# Patient Record
Sex: FEMALE | Race: BLACK OR AFRICAN AMERICAN | NOT HISPANIC OR LATINO | Employment: FULL TIME | URBAN - METROPOLITAN AREA
[De-identification: names, ages, dates, MRNs, and addresses within clinical notes are randomized per-mention and may not be internally consistent; named-entity substitution may affect disease eponyms.]

---

## 2024-10-29 ENCOUNTER — OFFICE VISIT (OUTPATIENT)
Dept: PAIN MEDICINE | Facility: CLINIC | Age: 59
End: 2024-10-29
Payer: MEDICARE

## 2024-10-29 VITALS
DIASTOLIC BLOOD PRESSURE: 74 MMHG | HEIGHT: 60 IN | HEART RATE: 77 BPM | WEIGHT: 133.19 LBS | SYSTOLIC BLOOD PRESSURE: 120 MMHG | BODY MASS INDEX: 26.15 KG/M2

## 2024-10-29 DIAGNOSIS — G89.4 CHRONIC PAIN SYNDROME: Primary | ICD-10-CM

## 2024-10-29 DIAGNOSIS — Z98.1 STATUS POST CERVICAL SPINAL FUSION: ICD-10-CM

## 2024-10-29 DIAGNOSIS — M96.1 POST LAMINECTOMY SYNDROME: ICD-10-CM

## 2024-10-29 DIAGNOSIS — Z98.1 STATUS POST LUMBAR SPINAL FUSION: ICD-10-CM

## 2024-10-29 DIAGNOSIS — F11.90 CHRONIC, CONTINUOUS USE OF OPIOIDS: ICD-10-CM

## 2024-10-29 PROCEDURE — 99999 PR PBB SHADOW E&M-EST. PATIENT-LVL III: CPT | Mod: PBBFAC,,, | Performed by: NURSE PRACTITIONER

## 2024-10-29 PROCEDURE — 99213 OFFICE O/P EST LOW 20 MIN: CPT | Mod: PBBFAC,PO | Performed by: NURSE PRACTITIONER

## 2024-10-29 PROCEDURE — 99214 OFFICE O/P EST MOD 30 MIN: CPT | Mod: S$PBB,,, | Performed by: NURSE PRACTITIONER

## 2024-10-29 RX ORDER — OXYCODONE AND ACETAMINOPHEN 10; 325 MG/1; MG/1
1 TABLET ORAL 3 TIMES DAILY PRN
Qty: 90 TABLET | Refills: 0 | Status: SHIPPED | OUTPATIENT
Start: 2024-12-01 | End: 2024-12-31

## 2024-10-29 RX ORDER — OXYCODONE AND ACETAMINOPHEN 10; 325 MG/1; MG/1
1 TABLET ORAL 3 TIMES DAILY PRN
Qty: 90 TABLET | Refills: 0 | Status: SHIPPED | OUTPATIENT
Start: 2024-12-31 | End: 2025-01-30

## 2024-10-29 RX ORDER — OXYCODONE AND ACETAMINOPHEN 10; 325 MG/1; MG/1
1 TABLET ORAL 3 TIMES DAILY PRN
Qty: 90 TABLET | Refills: 0 | Status: SHIPPED | OUTPATIENT
Start: 2024-11-01 | End: 2024-12-01

## 2024-11-01 RX ORDER — OMEPRAZOLE 40 MG/1
40 CAPSULE, DELAYED RELEASE ORAL EVERY MORNING
Qty: 90 CAPSULE | Refills: 3 | Status: SHIPPED | OUTPATIENT
Start: 2024-11-01

## 2024-11-11 ENCOUNTER — CLINICAL SUPPORT (OUTPATIENT)
Dept: ENDOSCOPY | Facility: HOSPITAL | Age: 59
End: 2024-11-11
Attending: INTERNAL MEDICINE
Payer: MEDICARE

## 2024-11-11 DIAGNOSIS — Z12.11 COLON CANCER SCREENING: ICD-10-CM

## 2024-11-11 RX ORDER — SODIUM, POTASSIUM,MAG SULFATES 17.5-3.13G
1 SOLUTION, RECONSTITUTED, ORAL ORAL DAILY
Qty: 1 KIT | Refills: 0 | Status: SHIPPED | OUTPATIENT
Start: 2024-11-11 | End: 2024-11-13

## 2024-11-23 ENCOUNTER — PATIENT MESSAGE (OUTPATIENT)
Dept: ENDOSCOPY | Facility: HOSPITAL | Age: 59
End: 2024-11-23
Payer: MEDICARE

## 2024-11-23 ENCOUNTER — TELEPHONE (OUTPATIENT)
Dept: ENDOSCOPY | Facility: HOSPITAL | Age: 59
End: 2024-11-23
Payer: MEDICARE

## 2024-11-24 NOTE — TELEPHONE ENCOUNTER
Referral for procedure from PAT appointment      Spoke to patient to schedule procedure(s) Colonoscopy       Physician to perform procedure(s) Dr. MILAGROS Ba  Date of Procedure (s) 1/24/25  Arrival Time 7:00 AM  Time of Procedure(s) 8:00 AM   Location of Procedure(s) [facility]  Type of Rx Prep sent to patient: Suprep  Instructions provided to patient via Postal Mail    Patient was informed on the following information and verbalized understanding. Screening questionnaire reviewed with patient and complete. If procedure requires anesthesia, a responsible adult needs to be present to accompany the patient home, patient cannot drive after receiving anesthesia. Appointment details are tentative, especially check-in time. Patient will receive a prep-op call 7 days prior to confirm check-in time for procedure. If applicable the patient should contact their pharmacy to verify Rx for procedure prep is ready for pick-up. Patient was advised to call the scheduling department at [phone] if pharmacy states no Rx is available. Patient was advised to call the endoscopy scheduling department if any questions or concerns arise.      [facility] Scheduling Department

## 2024-12-02 RX ORDER — METHOCARBAMOL 750 MG/1
TABLET, FILM COATED ORAL
Qty: 90 TABLET | Refills: 2 | Status: SHIPPED | OUTPATIENT
Start: 2024-12-02

## 2024-12-31 RX ORDER — OXYCODONE AND ACETAMINOPHEN 10; 325 MG/1; MG/1
1 TABLET ORAL 3 TIMES DAILY PRN
Qty: 90 TABLET | Refills: 0 | Status: SHIPPED | OUTPATIENT
Start: 2024-12-31 | End: 2025-01-30

## 2024-12-31 NOTE — TELEPHONE ENCOUNTER
----- Message from Giacomo sent at 12/31/2024  1:32 PM CST -----  Type:  RX Refill Request    Who Called: pt  Refill or New Rx:refill  RX Name and Strength:oxyCODONE-acetaminophen (PERCOCET)  mg per tablet  Would the patient rather a call back or a response via [facility]? call  Best Call Back Number: [phone]  Additional Information: pt states the pharmacy stated they are out of Rx at this time and pt states she would like to see if provider can send Rx to [facility]. Pt states she would like a call back to see when Rx has been sent.Thank you

## 2025-01-24 ENCOUNTER — TELEPHONE (OUTPATIENT)
Dept: ENDOSCOPY | Facility: HOSPITAL | Age: 60
End: 2025-01-24
Payer: COMMERCIAL

## 2025-01-24 DIAGNOSIS — Z12.11 SPECIAL SCREENING FOR MALIGNANT NEOPLASMS, COLON: Primary | ICD-10-CM

## 2025-01-24 RX ORDER — SODIUM, POTASSIUM,MAG SULFATES 17.5-3.13G
1 SOLUTION, RECONSTITUTED, ORAL ORAL DAILY
Qty: 1 KIT | Refills: 0 | Status: SHIPPED | OUTPATIENT
Start: 2025-01-24 | End: 2025-01-27

## 2025-01-25 ENCOUNTER — OFFICE VISIT (OUTPATIENT)
Dept: PAIN MEDICINE | Facility: CLINIC | Age: 60
End: 2025-01-25
Payer: COMMERCIAL

## 2025-01-25 VITALS
HEIGHT: 60 IN | SYSTOLIC BLOOD PRESSURE: 112 MMHG | BODY MASS INDEX: 27.71 KG/M2 | HEART RATE: 74 BPM | WEIGHT: 141.13 LBS | DIASTOLIC BLOOD PRESSURE: 65 MMHG

## 2025-01-25 DIAGNOSIS — Z98.1 STATUS POST LUMBAR SPINAL FUSION: ICD-10-CM

## 2025-01-25 DIAGNOSIS — G89.29 CHRONIC BILATERAL LOW BACK PAIN WITHOUT SCIATICA: ICD-10-CM

## 2025-01-25 DIAGNOSIS — M54.50 CHRONIC BILATERAL LOW BACK PAIN WITHOUT SCIATICA: ICD-10-CM

## 2025-01-25 DIAGNOSIS — M96.1 POST LAMINECTOMY SYNDROME: ICD-10-CM

## 2025-01-25 DIAGNOSIS — F11.90 CHRONIC, CONTINUOUS USE OF OPIOIDS: ICD-10-CM

## 2025-01-25 DIAGNOSIS — G89.4 CHRONIC PAIN SYNDROME: Primary | ICD-10-CM

## 2025-01-25 DIAGNOSIS — M47.816 LUMBAR SPONDYLOSIS: ICD-10-CM

## 2025-01-25 DIAGNOSIS — Z98.1 STATUS POST CERVICAL SPINAL FUSION: ICD-10-CM

## 2025-01-25 PROCEDURE — 1159F MED LIST DOCD IN RCRD: CPT | Mod: CPTII,S$GLB,, | Performed by: STUDENT IN AN ORGANIZED HEALTH CARE EDUCATION/TRAINING PROGRAM

## 2025-01-25 PROCEDURE — 99999 PR PBB SHADOW E&M-EST. PATIENT-LVL IV: CPT | Mod: PBBFAC,,, | Performed by: STUDENT IN AN ORGANIZED HEALTH CARE EDUCATION/TRAINING PROGRAM

## 2025-01-25 PROCEDURE — 3078F DIAST BP <80 MM HG: CPT | Mod: CPTII,S$GLB,, | Performed by: STUDENT IN AN ORGANIZED HEALTH CARE EDUCATION/TRAINING PROGRAM

## 2025-01-25 PROCEDURE — G2211 COMPLEX E/M VISIT ADD ON: HCPCS | Mod: S$GLB,,, | Performed by: STUDENT IN AN ORGANIZED HEALTH CARE EDUCATION/TRAINING PROGRAM

## 2025-01-25 PROCEDURE — 99213 OFFICE O/P EST LOW 20 MIN: CPT | Mod: S$GLB,,, | Performed by: STUDENT IN AN ORGANIZED HEALTH CARE EDUCATION/TRAINING PROGRAM

## 2025-01-25 PROCEDURE — 3074F SYST BP LT 130 MM HG: CPT | Mod: CPTII,S$GLB,, | Performed by: STUDENT IN AN ORGANIZED HEALTH CARE EDUCATION/TRAINING PROGRAM

## 2025-01-25 PROCEDURE — 3008F BODY MASS INDEX DOCD: CPT | Mod: CPTII,S$GLB,, | Performed by: STUDENT IN AN ORGANIZED HEALTH CARE EDUCATION/TRAINING PROGRAM

## 2025-01-25 RX ORDER — NALOXONE HYDROCHLORIDE 4 MG/.1ML
SPRAY NASAL
Qty: 1 EACH | Refills: 11 | Status: SHIPPED | OUTPATIENT
Start: 2025-01-25

## 2025-01-25 RX ORDER — OXYCODONE AND ACETAMINOPHEN 10; 325 MG/1; MG/1
1 TABLET ORAL 3 TIMES DAILY PRN
Qty: 90 TABLET | Refills: 0 | Status: SHIPPED | OUTPATIENT
Start: 2025-03-01 | End: 2025-03-31

## 2025-01-25 RX ORDER — OXYCODONE AND ACETAMINOPHEN 10; 325 MG/1; MG/1
1 TABLET ORAL 3 TIMES DAILY PRN
Qty: 90 TABLET | Refills: 0 | Status: SHIPPED | OUTPATIENT
Start: 2025-03-31 | End: 2025-04-30

## 2025-01-25 RX ORDER — OXYCODONE AND ACETAMINOPHEN 10; 325 MG/1; MG/1
1 TABLET ORAL 3 TIMES DAILY PRN
Qty: 90 TABLET | Refills: 0 | Status: SHIPPED | OUTPATIENT
Start: 2025-01-31 | End: 2025-03-02

## 2025-01-25 NOTE — PROGRESS NOTES
[facility] Established Visit    Chief Complaint   Patient presents with    Follow-up    Medication Refill         1/25/2025     9:28 AM 10/29/2024     9:14 AM 8/2/2024     9:57 AM   Last 3 PDI Scores   Pain Disability Index (PDI) 60 70 60       Interval Update 1/25/2025: Patient return to clinic for three month follow up on medication refill.  She is requesting a refill of her Percocet 10/325 t.i.d..  Patient states she continues to take these medications daily with benefit to her chronic pain symptoms.  She reports the medication provides her 70% relief of her symptoms and improves her functionality. Denies any new symptoms, weakness, bowel or bladder function changes, recent falls or trauma.      Interval update 10/29/24:Patient return to clinic for medication refill.  Patient has a history of chronic pain syndrome specifically low back pain with intermittent leg pain she also suffers from midback pain at times.  She is on chronic opioid therapy that consists of Percocet  t.i.d. 90 tablets per month which continue to provide her 60 70% relief of her symptoms and improvement in her functionality.  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  She denies any new pain denies any profound weakness denies any bowel or bladder dysfunction denies any saddle anesthesia denies any recent incident or trauma.    Interval Update 08/02/2024: Patient return to clinic for medication refill.  Patient continues to take Celebrex, Robaxin, and Percocet 10/325 t.i.d. p.r.n. to manage her pain.  She reports her pain is well-controlled with this regimen, and the medication improves her functionality..  She denies any side effects of the medications.  She states she was compliant with the an exercise regimen.  She denies any changes to her pain at this time.  She requests a refill of Robaxin, Celebrex, and Percocet.  She rates her pain 10/10  "today.    Interval Update 06/05/2024: Patient returns to clinic for low back pain.  She presents with a "red spot in her mid back"   There is also blanching in the area of concern.  She does report some pain in the area upon palpation discussed with patient that this is not typically something that I treat an interventional pain recommended a dermatology referral for further evaluation.  She denies any signs and symptoms of infection  her vitals all within normal limits.    Interval History 5/3/2024:  60 y/o female presents for a follow iup apppt for medication refilil.,she has a hx of chronic pain synderome specifically her chronic low back pain she is currently on a stable low dose opioid regimen consists of Percocet  t.i.d. 90 tablets per month which continue to provide her 80-90% relief and improved functionality.  She did report visits to her local gym, she has an established a home exercise plan that has helped her with lumbar stabilization and muscular strengthening.  She continues to take the chronic opioid therapy without any adverse side effects.    Interval History 4/4/2024:  59-year-old female presents today for a medication refill.  This is her 30 day follow-up she is currently on chronic opioid therapy that consists of Percocet  t.i.d. 90 tablets a month which she continues to report 80% relief when taking this medication with improved functionality.  She did acknowledge intermittently taking half a extra tablet to help with midback pain.  Discussed with patient that compliant with medications very important particularly with regard to chronic opioid therapy patient verbalized understanding.  She reported breakthrough pain in between her doses of Percocet I have recommended the use of extra Tylenol for breakthrough pain not to exceed 3000 mg in 24 hours.  As Tylenol is attached to her current opioid.  She denies any new pain denies profound weakness denies any bowel bladder dysfunction at " this time.        Interval update 03/08/24 58-year-old female that presents today for a follow-up appointment for medication refill.  She is currently on chronic opioid therapy that consists of Percocet  t.i.d. 90 tablets per month she is reporting % relief when taking this medication and improved functionality she reports being able to perform her ADLs.  Denies any adverse side effects with this medication he would reports today increased neck pain over the last few weeks in the past I have prescribed her Celebrex which she states has been effective.  She denies any profound weakness denies any bowel bladder dysfunction denies saddle anesthesia denies any new pain today.      Interval update 02/08/24 58-year-old female that presents today for a follow-up appointment for medication refill.  She is currently on a stable low dose opioid regimen  that consists of Percocet   t.I.d. 90 tablets per month that she reports continued provide her 80% relief with improved functionality.  Her primary source of pain is chronic low back with intermittent bilateral leg pain has a history of a lumbar few at L5-S1 that is intact this was performed in 2015.  She denies any adverse side effects with the chronic opioid regimen discussed that she would continue her last dose of pain medication was this morning.      Interval history 01/08/2024  58-year-old female that presents today for a follow-up appointment for medication refill.  She has a history of chronic low back and bilateral leg pain.  She has history of a previous posterior body fusion L5-S1 2015.  Additionally she has a C4-5 anterior cervical diskectomy and fusion in 2017.  She is currently on chronic opioid therapy that consists of Percocet  t.i.d. 90 tablets per month where she continues to report provides her relief at 80% with improved functionality.  She denies any adverse side effects with this medication.  Additionally she complained of  continued midback pain that is worse following physical therapy.  She reports not wanting to return to physical therapy as she feels as though this exacerbates her symptoms.  Her and I discussed that PT is part of continued improvement in her functionality.      Interval history 12/07/2023:  58-year-old female with a history of chronic low back and bilateral leg pain.  She does have a history of a previous posterior and body fusion L5-S1 hardware is intact 2015.  Additionally she has a C4-C5 anterior cervical diskectomy and fusion in 2017.  She is currently being provided chronic opioid therapy that consists of Percocet  t.i.d. 90 tablets per month that she can report provides her 80-90% relief of her pain and improvement in functionality.  Her pain score today she reports as 10/10.      Interval History (11/02/2023) - Ms. Tucker is following up for medication refill to manage chronic lbp pain.  A refill of Oxycodone-Acetaminophen  mg t.i.d. 90 tablets per month is being requested today.  She reports 100% relief with the current medication regimen.  She reports the following medication side effects: none.  Pain is currently rate 7/10 with a weekly range 7-8/10. Pt is also S/P TPI on 10/2 with 20% relief for 2 to 3 days.  She does report improved functionality when taking her chronic opioid medication.  She did report that her PCP ordered a thoracic MRI to rule out pathology she is scheduled for that in the upcoming weeks.      Interval history (10/02/2023):  returns today for follow up for medication refill currently she is on a stable low dose opioid regimen that consists of Norco  t.i.d. 90 tablets per month.  She continues to report 90% relief when taking this medication for chronic low back pain.  Today she is complaining of mid back pain onset 5-6 weeks she denies any radicular symptoms to the front of her chest.  She denies any recent incident or trauma denies any bowel bladder dysfunction at  this time. Currently, the back pain is stable.        Current Pain Scales:  Current: 10/10              Typical Range: 10-10/10       Interval History(09/08/2023):  Chasity Tucker returns today for follow up medication refill. She is currently on a stable low dose opoid regimen that consist of Norco  TID # 90. She typically has been taking Percocet  TID however the supply was limited which is why she was taking/scribe Norco  t.i.d..  She reports that the Norco is not as helpful her previous Percocet prescription.  We discussed today we will consider switching her back to Percocet  t.i.d..  She reports 50-60% relief of her pain when taking her chronic opoid therapy, she denies any adverse SEs.  Currently, the back,leg pain is stable.      Current Pain Scales:  Current: 10/10              Typical Range: 10-10/10     Interval History (08/21/2023):  Chasity Tucker returns today for follow up.  Currently, the leg & bank pain is stable she is currently on a stable low dose opoid regimen that consist of Norco  t.i.d. 90 tablets she continues to report that this medication provides her with 50-60% relief of her symptoms and improvement in her functionality.  She was previously taking Percocet  t.i.d. however due to the shortage of Percocet at the pharmacy we switched her to [place]  t.i.d..  She denies any adverse side effects with any of her medications denies any new pain denies any profound weakness denies any saddle anesthesia at this time.  Of note she did report that she would like to switch back to Percocet  t.i.d. as this provided her better relief than the Norco .    Current Pain Scales:  Current: 10/10              Typical Range: 10-10/10     Interval History (07/24/2023):  Chasity Tucker returns today for follow up for chronic neck and low back pain.  She has not been seen since February of this year she is returning to  this clinic due to an insurance change her current pain provider Dr. Maurice is unable to take her insurance so she would like to switch back to our office.  She does have a history of chronic pain and is currently taking chronic opioid therapy that consists of Percocet .  She does report that she has had a few injections based on my recollection she does have a history of a spine fusion C4-C5 anterior cervical discectomy and fusion 2017 and  Previous posterior and body fusion L5-S1.  Hardware is intact - 2015.        Current Pain Scales:  Current: 10/10              Typical Range: 10-10/10     Interval Updates: (02/16/2023):  Chasity Tucker returns today for follow up for medication refill. She is currently on a stable low does opoid regimen that consist Percocet 7.5-325 TID # 90  Currently, the the low back and bilateral leg pain is worse. She would like to consider injections with our office, she was previously provided a caudal MIKALA per external provider/Dr. Salinas she she reports this has helped her pain in the past.  We also discussed frequent ED visits in the month of January she requested an increase in her chronic opioid therapy I explained to her that continuous increases in her chronic opioid therapy will not ultimately benefit her pain in that we should optimize other medications as well as attempt injections to help her.      Current Pain Scales:  Current: 9/10              Typical Range: 8-9/10     01/18/2023-Chasity Tucker is a 59 y.o. female who  has a past medical history of Allergy, Anemia, Anxiety, Asthma, Chronic back pain, Chronic neck pain, Depression, Disc, Full dentures, GERD (gastroesophageal reflux disease), Hypokalemia, Lumbar spondylosis (9/7/2022), and Recurrent upper respiratory infection (URI).  Patient presents requesting a medication refill she is currently on chronic opioid therapy that consists of Percocet 7.5-325 t.i.d. she reports 50% relief when  taking this medication and improvement in her functionality.  She continues to report low back pain radiating into her hips bilaterally and radiating into her legs stopping just above her knees.  She denies any new pain denies any profound weakness, denies any recently incident or trauma, denies any bowel bladder dysfunction.      12/15/2022 - Ms. Tucker is following up for medication refill to manage chronic low back pain.  A refill of Oxycodone-Acetaminophen 7.5-325 mg TID PRN is being requested today.  She reports 100% relief with the current medication regimen with improved functionality. She reports   She reports the following medication side effects: none.  Pain is currently rate 10/10 with a weekly range 10-10/10.      11/17/2022 - Ms. Kelsey Tucker is following up for medication refill to manage chronic back pain.  A refill of Oxycodone-Acetaminophen 7.5-325  TID # 90 mg is being requested today.  She reports 100% relief with the current medication regimen.  She reports the following medication side effects: none.  Pain is currently rate 10/10 with a weekly range 8-9/10.       10/6/2022 - Ms. Kelsey Tucker is following up for neck, right shoulder and low back pain. Pain is currently rate 10/10 with a weekly range 10-10/10.  Pain is described as sharp and stabbing with intermittent lumbar spasms. Pain is worse with any activity, she reports failing PT, she is reporting relief when taking perc 7.5 TID  she has been on this medication chronically per Dr. Salinas/external provider.     9/7/2022 - Ms. Tucker is following up for low back pain.  Pain is currently rate 10/10 with a weekly range 10-10/10.  It is described as Aching, Burning, and Sharp.       Chasity Tucker presents today complaining of low back bilateral leg pain, pain is been ongoing pain is constant, pain is aggravated with sitting, standing, bending walking, pain is mitigated with pain medicine.  This is my 1st clinic visit with this  patient she is a former patient of /[facility]le [facility] she was released from his practice due to her insurance not covering his network. She then saw Dr. Swan/[facility] an attempt to establish PM care. See his initial note below. She was provide a Rx of Percocet 7.5-325 # 45 pills to cover her pain and to prohibit withdrawal symptom. She  endorses not ever experiencing withdrawal symptoms despite being without medication for approximately 2 weeks. She reports only taking pain medication PRN, her previous dose that she reports provides her the most relief is Percocet  TID which she received for years per Dr. Salinas      Background from Chart Review  Per Dr. Swan's note-Original Pain Description:7/29/22  The pain is located in the lower back and radiates to both legs. The pain is described as aching and spasm. Exacerbating factors: Sitting, Standing, Bending and Walking. Mitigating factors medicine. Symptoms interfere with daily activity and sleeping. The patient feels like symptoms have been unchanged. Patient denies night fever/night sweats, urinary incontinence, bowel incontinence, significant weight loss, significant motor weakness and loss of sensations.  Patient was seen by outside Pain Management, but presents to this clinic because her insurance changed.     Treatment History  PT/OT: 6 weeks of Conservative therapy (PT/Chiro/Home Exercises with Dates)  Mar 2022 to June 2022  HEP: not currently   TENS:  Injections: Yes - Epidurals from Dr. Salinas's office that never helped  Surgery:  -C4-C5 anterior cervical discectomy and fusion 2017  -Previous posterior and body fusion L5-S1.  Hardware is intact - 2015  Medications:   - NSAIDS:   - MSK Relaxants:   - TCAs:   - SNRIs:   - Topicals:   - Opioids: Percocet  TID   - Anticonvulsants:     Current Pain Medications  Percocet  TID # 90    Full Medication List    Current Outpatient Medications:     albuterol (PROVENTIL/VENTOLIN HFA) 90 mcg/actuation  inhaler, Inhale 2 puffs into the lungs every 6 (six) hours as needed for Wheezing., Disp: 18 g, Rfl: 0    brompheniramine-pseudoeph-DM (BROMFED DM) 2-30-10 mg/5 mL Syrp, , Disp: , Rfl:     budesonide-formoterol 160-4.5 mcg (SYMBICORT) 160-4.5 mcg/actuation HFAA, , Disp: , Rfl:     butalbital-acetaminophen-caffeine -40 mg (FIORICET, ESGIC) -40 mg per tablet, TAKE 1 TABLET BY MOUTH EVERY DAY AS NEEDED FOR HEADACHE, Disp: 30 tablet, Rfl: 2    celecoxib (CELEBREX) 200 MG capsule, Take 1 capsule (200 mg total) by mouth once daily., Disp: 30 capsule, Rfl: 1    ferrous sulfate 325 (65 FE) MG EC tablet, Take 1 tablet (325 mg total) by mouth 2 (two) times daily with meals., Disp: , Rfl: 0    fluticasone propionate (FLONASE) 50 mcg/actuation nasal spray, 2 sprays (100 mcg total) by Each Nostril route once daily., Disp: 16 mL, Rfl: 0    fluticasone propionate (FLOVENT HFA) 110 mcg/actuation inhaler, Inhale 1 puff into the lungs every 12 (twelve) hours., Disp: 12 g, Rfl: 3    ipratropium (ATROVENT) 21 mcg (0.03 %) nasal spray, SPRAY 2 SPRAYS BY NASAL ROUTE 3 TIMES A DAY AS NEEDED FOR RHINITIS, Disp: 30 mL, Rfl: 6    LIDOcaine (LIDODERM) 5 %, Place 1 patch onto the skin once daily. Remove & Discard patch within 12 hours or as directed by MD, Disp: 30 patch, Rfl: 2    loratadine (CLARITIN) 10 mg tablet, Take 1 tablet (10 mg total) by mouth once daily., Disp: 60 tablet, Rfl: 5    methocarbamoL (ROBAXIN) 750 MG Tab, TAKE 1 TABLET BY MOUTH EVERY 8 HOURS, Disp: 90 tablet, Rfl: 2    NEBULIZER ACCESSORIES MISC,  EA, Refills(s) 0, eRx: [facility]/pharmacy #[id number], 1, Print SHEFALI Number, 1 device Please use as needed for wheezing Albuterol vials will be prescribed separately, 152 cm, cm, 04/18/20 23:36:00 CDT, Measured height in cm, 74.2, kg, 04/01/20 13:17:00 CDT, Meliton..., Disp: , Rfl:     omeprazole (PRILOSEC) 40 MG capsule, TAKE 1 CAPSULE (40 MG TOTAL) BY MOUTH EVERY MORNING., Disp: 90 capsule, Rfl: 3    oxyCODONE-acetaminophen  (PERCOCET)  mg per tablet, Take 1 tablet by mouth 3 (three) times daily as needed for Pain., Disp: 90 tablet, Rfl: 0    oxyCODONE-acetaminophen (PERCOCET)  mg per tablet, Take 1 tablet by mouth 3 (three) times daily as needed for Pain., Disp: 90 tablet, Rfl: 0    sodium chloride (OCEAN) 0.65 % nasal spray, 1 spray by Nasal route as needed for Congestion., Disp: 30 mL, Rfl: 0    sodium,potassium,mag sulfates (SUPREP BOWEL PREP KIT) 17.5-3.13-1.6 gram SolR, Take 177 mLs by mouth once daily. for 2 days, Disp: 1 kit, Rfl: 0    azelastine (ASTELIN) 137 mcg (0.1 %) nasal spray, 2 sprays (274 mcg total) by Nasal route 2 (two) times daily as needed for Rhinitis. Donot snort (because it tastes bad), Disp: 30 mL, Rfl: 1    naloxone (NARCAN) 4 mg/actuation Spry, 4mg by nasal route as needed for opioid overdose; may repeat every 2-3 minutes in alternating nostrils until medical help arrives. Call 911, Disp: 1 each, Rfl: 11    [START ON 1/31/2025] oxyCODONE-acetaminophen (PERCOCET)  mg per tablet, Take 1 tablet by mouth 3 (three) times daily as needed for Pain., Disp: 90 tablet, Rfl: 0    [START ON 3/1/2025] oxyCODONE-acetaminophen (PERCOCET)  mg per tablet, Take 1 tablet by mouth 3 (three) times daily as needed for Pain., Disp: 90 tablet, Rfl: 0    [START ON 3/31/2025] oxyCODONE-acetaminophen (PERCOCET)  mg per tablet, Take 1 tablet by mouth 3 (three) times daily as needed for Pain., Disp: 90 tablet, Rfl: 0     Review of Systems  Review of Systems   Musculoskeletal:  Positive for back pain, joint pain, myalgias and neck pain.       Medical History  Past Medical History:   Diagnosis Date    Allergy     Anemia     Anxiety     Asthma     Chronic back pain     Chronic neck pain     Depression     Disc     Full dentures     GERD (gastroesophageal reflux disease)     Hypokalemia     Lumbar spondylosis 9/7/2022    Recurrent upper respiratory infection (URI)         Surgical History  Past Surgical History:    Procedure Laterality Date    BACK SURGERY      CERVICAL SPINE SURGERY       SECTION      removal foreign body L Leg      REMOVAL OF HARDWARE FROM SPINE N/A 2019    Procedure: REMOVAL, HARDWARE, SPINE;  Surgeon: Edmar Milner MD;  [facility];  Service: Orthopedics;  Laterality: N/A;  medtronic     right shoulder surgery      SPINAL FUSION      TOTAL ABDOMINAL HYSTERECTOMY W/ BILATERAL SALPINGOOPHORECTOMY          Physical Exam      General Musculoskeletal Exam   Gait: normal     Back (L-Spine & T-Spine) / Neck (C-Spine) Exam     Tenderness Posterior midline palpation reveals tenderness of the Upper L-Spine, Lower T-Spine and Upper T-Spine. Right paramedian tenderness of the Lower T-Spine, Upper L-Spine, Upper T-Spine and Upper C-Spine. Left paramedian tenderness of the Lower T-Spine, Upper L-Spine, Lower C-Spine, Upper T-Spine and Upper C-Spine.     Back (L-Spine & T-Spine) Range of Motion   Lateral bend right:  abnormal   Lateral bend left:  abnormal   Rotation right:  abnormal   Rotation left:  abnormal     Comments:  TTP mid back       Muscle Strength   Right Lower Extremity   Quadriceps:  5/5   Gastrocsoleus:  5/5   Left Lower Extremity   Quadriceps:  5/5   Gastrocsoleus:  5/5       Vitals:    25 0912   BP: 112/65   Pulse: 74   Weight: 64 kg (141 lb 1.5 oz)   Height: 5' (1.524 m)   PainSc: 10-Worst pain ever       Imaging  XR LUMBAR SPINE COMPLETE 5 VIEW     CLINICAL HISTORY:  Back pain or radiculopathy, prior surgery, new symptoms;Dorsalgia, unspecified     TECHNIQUE:  AP, lateral, spot and bilateral oblique views of the lumbar spine were performed.     COMPARISON:  2019     FINDINGS:  Previous posterior and body fusion L5-S1.  Hardware is intact.     Lumbar vertebral body heights are maintained.  Remaining disc spaces are maintained.  Degenerative facet arthropathy L3-4 and L4-5.  Slight grade 1 anterolisthesis L4 on L5.     MRI LUMBAR SPINE WITHOUT CONTRAST     CLINICAL  HISTORY:  Low back pain, prior surgery, new symptoms;     TECHNIQUE:  Multiplanar, multisequence MR images were acquired from the thoracolumbar junction to the sacrum without the administration of contrast.     COMPARISON:  MR L-spine 09/23/2022,     FINDINGS:  Artifact is present, this diminishes image quality and diminishes the sensitivity of the examination.     Postoperative change of posterior instrumented fusion at L5-S1 with interbody spacer and bilateral laminectomy at L5.  Associated susceptibility artifact limits evaluation of surrounding structures.     Lumbar spine alignment is stable.  There is no evidence for high-grade spondylolisthesis, there is no evidence for high-grade or acute compression fracture deformity.  The vertebral body heights are well maintained, with no fracture.  No marrow signal abnormality suspicious for an infiltrative process.     When accounting for artifact, the conus is normal in appearance, and terminates at the L1-L2 level.  The adjacent soft tissue structures show no significant abnormalities.     Mild multilevel degenerative disc disease with minimal height loss and desiccation.There are findings of lumbar spondylosis, not significantly changed compared to recent prior.     T12-L1: No spinal canal stenosis or neural foraminal narrowing.     L1-L2: No spinal canal stenosis or neural foraminal narrowing.     L2-L3: Diffuse disc bulge.  No spinal canal stenosis or neural foraminal narrowing.     L3-L4: Diffuse disc bulge with facet arthropathy, contribute to mild bilateral foraminal narrowing.  No spinal canal stenosis.     L4-L5: Diffuse disc bulge with facet arthropathy, contribute to mild bilateral neural foraminal narrowing.  No spinal canal stenosis.     L5-S1: Postoperative changes as above.  Central canal is posteriorly decompressed.  Bilateral facet arthropathy.  Mild neural foraminal narrowing.     Impression:     Postoperative change of posterior instrumented fusion  at L5-S1 with interbody spacer and bilateral laminectomy at L5.     Mild lumbar spondylosis, not significantly changed compared to recent prior MR. No high-grade neural foraminal narrowing or spinal canal stenosis.     Electronically signed by resident: Lokesh Nguyen  Date:                                            12/23/2022  Time:                                           02:43     Electronically signed by:Sean Fleming  Date:                                            12/23/2022  Labs:  BMP  Lab Results   Component Value Date     09/03/2024    K 4.1 09/03/2024     09/03/2024    CO2 26 09/03/2024    BUN 8 09/03/2024    CREATININE 0.9 09/03/2024    CALCIUM 8.9 09/03/2024    ANIONGAP 9 09/03/2024    ESTGFRAFRICA >60.0 02/08/2022    EGFRNONAA >60.0 02/08/2022     Lab Results   Component Value Date    ALT 12 09/03/2024    AST 18 09/03/2024    ALKPHOS 79 09/03/2024    BILITOT 0.3 09/03/2024       Assessment:  Problem List Items Addressed This Visit          Neuro    Lumbar spondylosis       Psychiatric    Chronic, continuous use of opioids       Orthopedic    Post laminectomy syndrome    Chronic bilateral low back pain without sciatica     Other Visit Diagnoses       Chronic pain syndrome    -  Primary    Status post lumbar spinal fusion        Status post cervical spinal fusion                  08/23/2022 - Chasity Tucker is a 59 y.o. female who  has a past medical history of Allergy, Anemia, Anxiety, Asthma, Chronic back pain, Chronic neck pain, Depression, Disc, Full dentures, GERD (gastroesophageal reflux disease), Hypokalemia, Lumbar spondylosis (9/7/2022), and Recurrent upper respiratory infection (URI).  By history and examination this patient has chronic low back pain with radiculopathy.  The underlying cause cause is facet arthritis, degenerative disc disease, foraminal stenosis, central canal stenosis, muscles strain and deconditioning.  Pathology is confirmed by imaging.  We  discussed the underlying diagnoses and multiple treatment options including non-opioid medications, opioid medications, interventional procedures, physical therapy, home exercise and core muscle enhancement.  The risks and benefits of each treatment option were discussed and all questions were answered.      Patient has a 7 year history of low back pain she is status post L5-S1 laminectomy with a posterior fusion and has continued on chronic opioids without tapering.  She is now disabled and dependent upon opioids.  I discussed that we will provide her with a short term prescription, but that she will need to return to clinic in 2 weeks to meet Dr. Bourne and discuss chronic opioid therapy.    9/7/22 - Today is my first day seeing this patient in [place].  On exam she is exquisitely TTP around the hips, low back, and upper buttock regions bordering on an exaggerated pain response.  Her symptoms seem more consistent with myofascial pain rather than radicular pain though there may be a component of facet arthropathy above the fusion.  Toward better understanding her pathology, I will order an MRI of the lumbar spine.  Patient was advised that continued treatment and management is predicated on working with me to find and treat the underlying cause of her pain ultimately toward eliminating chronic opioid therapy.  Records from Dr. Salinas's office reviewed today.      10/6/2022- 57-year-old female with a history of chronic low back pain she does have a history of L5-S1 laminectomy with posterior fusion, she was continued on chronic opioid therapy current external provider Dr. Salinas.  Her pain today seems to be centered around her hips, low back she also complained of upper buttocks regional pain.  She denies any radicular symptoms I do believe there may be a facetogenic component to her pain above her fusion.  I have not received records from her previous provider today I reviewed her lumbar MRI that was unremarkable  except for lumbar facet arthropathy at L4-5 the MRI did not show any central or neural foraminal stenosis.  She continues to request Percocet 7.5-325 t.i.d. stating that this is the only thing that is helping her pain.  She refuses to return to physical therapy although after long discussion she is willing to return. I have discussed that Dr. Bourne will be leaving the practice and the goal for her should be to wean off of her chronic opioid therapy, patient was advised on last clinic visit with Dr. Bourne that continued treatment and management is all predicated on us finding the underlying cause of her pain and eliminating chronic opioid therapy.    11/17/20224273-19-ihfn-old female with a history of chronic low back pain she also has history of L5-S1 laminectomy with posterior fusion.  She is currently on chronic opioid therapy that consists of Percocet 7.5-325 t.i.d. 90 pills per month she presents today for medication refill for review of her [place]  and recent UDS found to be compliant.  Discussed I will refill her medications today.  Also refer her to physical therapy as I do believe this will help with lumbar stabilization and muscular strengthening patient verbalized understanding and agreed.  Lastly she is reporting % relief when taking her pain medication Ms. Last for 4-5 hours and then her pain returns.    12/15/2022-56 y/o female with a hx of chronic low back syndrome( fusion of L5-S1 ) presented today for  a medication refill, she is currently on a stable low dose opioid regimen that consists of Percocet 7.5-325 t.i.d. p.r.n. for mainly chronic low back pain, she has not  shown any aberrant  behavior regarding her chronic opoid therapy. I discussed that Dr. Bourne will be leaving the practice and that she will need to meet the  new Pain Physician to discuss future chronic opoid therapy refills, patient verbalized understanding and agreed.     01/18/20231758-74-rhzv-old female with a history of  chronic low back syndrome, she has a history of lumbar fusion L5-S1 she also has a history of C4-C5 anterior cervical discectomy and fusion 2017. She complains of multifactorial pain in her midback, low back and bilateral leg stopping just below her knees, we continue to manage her pain with chronic opioid therapy that consists of Percocet 7.5-325 t.i.d. she reports 50% relief when taking this medication improved functionality.  She is not shown any aberrant behavior with regard to her pain medication however she has a history of frequent ED visits.  I discussed her lumbar MRI in detail her previous pain provider was providing her with intermittent lumbar MIKALA based on her recent lumbar MRI do not recommend a lumbar MIKALA at this time.  See plan below that was agreed upon and discussed today.      02/16/20237803-35-wxmx-old female with a history of chronic low back and lumbar radiculopathy she does have history of L5-S1 laminectomy with a posterior fusion.  She is currently on chronic opioid therapy with our office that consists of Percocet 7.5-325 t.i.d. 90 pills per month which she reports provides with 30-40% relief.  She previously received epidural injections specifically a caudal MIKALA from external pain provider in addition to receiving chronic opioid therapy which in combination helped her symptoms.  Today I recommended we start low-dose Lyrica at 25 mg daily with the goal of increasing this as tolerable.  We will also schedule her for caudal MIKALA which I think will also reduce her symptoms.  As I discussed with her my goal is to reduce her ED visits for low back and bilateral leg pain patient verbalized understanding and agreed.      07/24/20239793-76-iagc-old female with history of chronic neck and low back pain.  She does have a history of a L5-S1 laminectomy with posterior fusion.  Additionally she has a history of a cervical anterior fusion at C4-5.  She is currently being seen by a pain provider outside of the  [facility] system Dr. Maurice.  She did report that he no longer takes her insurance and needed to switch back to the [facility] system for pain management.  She is currently on chronic opioid therapy with his office, I will need to request medical records before we can continue chronic opioid therapy.  Also discussed I will need to obtain a UDS today as she will be returning to [facility] [place].     8/21/2023-Chasity Tucker is a 59 y.o. female who  has a past medical history of Allergy, Anemia, Anxiety, Asthma, Chronic back pain, Chronic neck pain, Depression, Disc, Full dentures, GERD (gastroesophageal reflux disease), Hypokalemia, Lumbar spondylosis (9/7/2022), and Recurrent upper respiratory infection (URI).  By history and examination this patient has chronic low back pain with radiculopathy.  The underlying cause cause is facet arthritis, degenerative disc disease, foraminal stenosis, central canal stenosis, and deconditioning.  Pathology is confirmed by imaging.  We discussed the underlying diagnoses and multiple treatment options including non-opioid medications, physical therapy, core muscle enhancement, activity modification, and weight loss.  The risks and benefits of each treatment option were discussed and all questions were answered.      9/8/20236734-42-trsi-old female with history of chronic low back and bilateral leg pain she does have history of lumbar surgery L5-S1 laminectomy with posterior fusion.  She is currently on chronic opioid therapy that reduces her symptoms by 50-60% and improves her functionality.  Upon review of her  and recent UDS they are compliant.    10/02/2023-Chasity Tucker is a 59 y.o. female who  has a past medical history of Allergy, Anemia, Anxiety, Asthma, Chronic back pain, Chronic neck pain, Depression, Disc, Full dentures, GERD (gastroesophageal reflux disease), Hypokalemia, Lumbar spondylosis (9/7/2022), and Recurrent upper respiratory  infection (URI).  By history and examination this patient has chronic low back pain with radiculopathy.  The underlying cause cause is facet arthritis, degenerative disc disease, muscle dysfunction, muscles strain, and deconditioning.  Pathology is confirmed by imaging.  We discussed the underlying diagnoses and multiple treatment options including non-opioid medications, opioid medications, interventional procedures, physical therapy, home exercise, core muscle enhancement, activity modification, and weight loss.  The risks and benefits of each treatment option were discussed and all questions were answered.      11/02/20231008-80-oxtf-old female with a history of chronic low back pain she also is reporting midback pain.  She is a history of a lumbar fusion at L5-S1 with laminectomy.  Additionally she has history of a cervical anterior fusion at C4-5.  We have been providing her chronic opioid therapy Percocet  t.i.d. 90 tablets per month that she continues to report improved her symptoms by % with improved functionality.  Based on my history and exam today she has been having more chronic midback pain, an MRI was ordered of her mid back by her PCP discussed that we will review these images on her follow-up appointment.  I will continue to support her chronic opioid therapy as she has not shown any aberrant behavior regard to her medications.    12/07/20233619-45-ipsw-old female with history of chronic low back pain.  She has a history of lumbar fusion L5-S1 with laminectomy.  In addition she has a cervical anterior fusion at C4-5.  We are going to provide her chronic opioid therapy that consists of Percocet  t.i.d. 90 tablets per month that continued to improve her symptoms and improve her functionality.  She reports 89% relief when taking this medication she denies any adverse side effects with the medication.    01/08/20248012-99-eojy-old female with history of chronic low back pain bilateral leg pain.   She has a history of a lumbar fusion L5-S1 with laminar she also has a history of a cervical anterior fusion at C4-5 we are currently providing her chronic opioid therapy that consists of Percocet  t.i.d. 90 tablets per month which continue to provide her with 70% relief and improvement in her functionality.  Based on history and exam she did complain of tenderness in the mid back area I previously ordered a MRI of her thoracic as she did complain of radicular symptoms to the front of her chest.  She has not obtain this image discussed that she should continue with physical therapy and home stretching.  I do not see that this is an acute issue  Visit we will consider trigger point injections with steroids.      02/08/2024 - 58-year-old female with a history of chronic low back and bilateral leg pain she did report that her bilateral leg pain is intermittent.  She does have history of a lumbar fusion L5-S1 she also has a history of a cervical anterior fusion at C4-5.  We are currently providing her a stable low dose opioid regimen that consists of  Percocet  t.I.d. 90 tablets per month which she reports provided her 80-90% relief and improved functionality that allows her to perform her ADLs.  Her last dose of medication was this morning.  Patient's history and exam she continues to complain of mid to low back pain with intermittent bilateral leg pain.  She is never shown any aberrant behavior with regard to her chronic opioid therapy discussed that we will continue.    03/08/20241335-58-gyvl-old female with a history of chronic low back and bilateral leg pain she does have history of a lumbar fusion L5-S1 in addition she has a history of a cervical anterior fusion at C4-5.  We are currently providing her chronic opioid therapy consists of Percocet  t.i.d. 90 tablets per month she reports continued relief at % when taking this medication for 5-6 hours this improves her functionality allows her to  have a better quality of life.  She did report increased neck pain today that I think is related cervical myofascial syndrome and facet arthritis adjacent to her C4-5 fusion.  She reports flare-ups intermittently discussed I will provide her with Celebrex which she has used in the past that was helpful in reducing her neck pain.  She is never shown any aberrant behavior regard to her chronic opioid therapy to so we will refill her medication.      4/4/2024:  59-year-old woman with a history of chronic low back and bilateral leg pain she does have a history of a lumbar fusion L5-S1 in addition she has a cervical anterior fusion at C4-5 we are providing her chronic opioid therapy that consists of Percocet  t.i.d. 90 tablets per month which continue to provide her 70% relief her symptoms with improved functionality.  She is never shown any aberrant behavior with regard to her chronic opioid therapy discussed that we will continue she did acknowledge taking an extra half of a tablet in between her current t.i.d. dosing I recommended the patient to utilize Tylenol extra-strength as being compliant with her chronic opioid therapy is imperative.  Also educated patient she should not exceed 3000 mg in 25.  Of Tylenol patient verbalized understanding see plan agreed upon below.    05/03/20244373-93-vzlk-old female with a history of chronic low back and bilateral leg pain she does have a history of lumbar fusion L5-S1 in addition she has a cervical anterior fusion at C4-5.  We are providing her chronic opioid therapy for chronic pain syndrome she is currently on a stable low dose opioid regimen consists of Percocet  t.i.d. 90 tablets per month which continue to provide her 80-90% relief of her symptoms and improvement in her functionality.  She is never shown any aberrant to her chronic opioid therapy discussed with the patient today that I will put her on a three-month cycle for her chronic pain medication patient  verbalized understanding and agreed.      06/05/2024 -59-year-old female that presents today with  midback red spot that appears to be sensitive to touch,  discussed with patient's I will refer her to dermatology as  her etiology is unclear I am also recommended that she follow up with her PCP.  Patient verbalized understanding and agreed.    10/29/5837-29-udej-old female with a history of chronic pain syndrome specifically chronic low back pain with intermittent leg pain.  She does have history of a lumbar fusion at L5-S1 additionally she has a history of a cervical anterior fusion at C4-5.  We are currently providing her chronic opioid therapy that consists of Percocet [id number] tablets a month which she continues to report provides a 60 70% relief of her symptoms and improved functionality.  She has not shown any aberrant behavior with regard to her chronic opioid therapy.  Recommended we continue with chronic opioid therapy see plan agreed upon below.    01/25/2025 - patient presents today for follow up visit in for refill of her chronic pain medications.  She reports 70% improvement in her pain with current medication regimen consisting of Percocet 10/325 t.i.d. p.r.n.  I did  the patient on the side effects of long-term opioid use.  I do recommend weaning this medication in the future if tolerated.  Patient declines a reduction in her medication at today's visit.  I did also recommend physical therapy and procedural interventions such as medial branch blocks/RFA for her low back pain.  Patient declines at this time.    Treatment Plan:   Procedures:  Discussed with the patient that she may benefit from lumbar medial branch blocks/RFA.  Patient declines at this time.  PT/OT/HEP: I have stressed the importance of physical activity and a home exercise plan to help with pain and improve health.  Recommended referral to physical therapy, patient declines at this time.  Medications:    - I will refill her  Percocet 10/325 t.i.d. p.r.n. I did recommend decreasing/weaning this medication in the future if tolerated.  Patient declines dose reduction at this time.  - I did discuss with the patient that I do not recommend chronic opioid medications for low back pain or neck pain as they have not be shown to be superior to nonopioid treatments and can lead to worse outcomes in the long term. Patient counseled about the side effects of long term use of opioids including dependence, tolerance, addiction, respiratory depression, somnolence, and immune and endocrine dysfunction.   -  Reviewed and consistent with medication use as prescribed.  Imaging:  Available imaging reviewed.  Follow Up: 3 months    Felecia Jimenez DO   Interventional Pain Management      Disclaimer: This note was partly generated using dictation software which may occasionally result in transcription errors.

## 2025-01-30 ENCOUNTER — TELEPHONE (OUTPATIENT)
Dept: GASTROENTEROLOGY | Facility: CLINIC | Age: 60
End: 2025-01-30
Payer: COMMERCIAL

## 2025-01-30 NOTE — TELEPHONE ENCOUNTER
Spoke with Chasity:  - she has talked with her insurance provider and they will cover the cost but it will take them a couple of days  - Endo Scheduling updated to cancel 1/13 and reschedule

## 2025-01-30 NOTE — TELEPHONE ENCOUNTER
----- Message from Yessy sent at 1/30/2025 10:29 AM CST -----  Regarding: appt / procedure on 01/31  Contact: [phone]  Pt called regarding needing to cancel and reschedule her procedure for her coloscopy she has scheduled for tomorrow because she does not have the funds to pay for the prep. She needs to call her insurance to get this straighten out      Please call back at [phone]

## 2025-01-31 ENCOUNTER — TELEPHONE (OUTPATIENT)
Dept: ENDOSCOPY | Facility: HOSPITAL | Age: 60
End: 2025-01-31
Payer: COMMERCIAL

## 2025-01-31 DIAGNOSIS — Z12.11 SCREEN FOR COLON CANCER: Primary | ICD-10-CM

## 2025-01-31 RX ORDER — SODIUM, POTASSIUM,MAG SULFATES 17.5-3.13G
1 SOLUTION, RECONSTITUTED, ORAL ORAL DAILY
Qty: 1 KIT | Refills: 0 | Status: SHIPPED | OUTPATIENT
Start: 2025-01-31 | End: 2025-02-02

## 2025-01-31 NOTE — TELEPHONE ENCOUNTER
Referral for procedure from  [facility] referral (see Appts tab)      Spoke to pt to reschedule procedure(s) Colonoscopy       Physician to perform procedure(s) Dr. ROMÁN Knight  Date of Procedure (s) 3/14/25  Arrival Time 6:30 AM  Time of Procedure(s) 7:30 AM   Location of Procedure(s) [facility] [facility]  Type of Rx Prep sent to patient: Suprep  Instructions provided to patient via Postal Mail    Patient was informed on the following information and verbalized understanding. Screening questionnaire reviewed with patient and complete. If procedure requires anesthesia, a responsible adult needs to be present to accompany the patient home, patient cannot drive after receiving anesthesia. Appointment details are tentative, especially check-in time. Patient will receive a prep-op call 7 days prior to confirm check-in time for procedure. If applicable the patient should contact their pharmacy to verify Rx for procedure prep is ready for pick-up. Patient was advised to call the scheduling department at [phone] if pharmacy states no Rx is available. Patient was advised to call the endoscopy scheduling department if any questions or concerns arise.       Endoscopy Scheduling Department         Arianna Estes routed conversation to You58 minutes ago (9:54 AM)      Lulu Mclain, RN  [facility]; [place] [facility] Staff23 hours ago (11:07 AM)     [place]  Pls cancel scope 1/31, needs to reschedule pls call     Lulu Mclain RN23 hours ago (11:07 AM)     [place]  Spoke with Chasity:  - she has talked with her insurance provider and they will cover the cost but it will take them a couple of days  - Endo Scheduling updated to cancel 1/13 and reschedule         Note     Lulu Mclain, RNYesterday (10:49 AM)     [place]  ----- Message from Yessy sent at 1/30/2025 10:29 AM CST -----  Regarding: appt / procedure on 01/31  Contact: [phone]  Pt called regarding needing to cancel and reschedule her procedure  for her coloscopy she has scheduled for tomorrow because she does not have the funds to pay for the prep. She needs to call her insurance to get this straighten out       Please call back at [phone]            Note

## 2025-02-24 DIAGNOSIS — J45.50 SEVERE PERSISTENT ASTHMA, UNSPECIFIED WHETHER COMPLICATED: ICD-10-CM

## 2025-02-24 RX ORDER — BUTALBITAL, ACETAMINOPHEN AND CAFFEINE 50; 325; 40 MG/1; MG/1; MG/1
TABLET ORAL
Qty: 30 TABLET | Refills: 2 | Status: SHIPPED | OUTPATIENT
Start: 2025-02-24

## 2025-02-24 NOTE — TELEPHONE ENCOUNTER
No care due was identified.  [facility] Embedded Care Due Messages. Reference number: [id number].   2/24/2025 3:18:29 PM CST

## 2025-02-27 ENCOUNTER — TELEPHONE (OUTPATIENT)
Dept: PAIN MEDICINE | Facility: CLINIC | Age: 60
End: 2025-02-27
Payer: COMMERCIAL

## 2025-02-27 RX ORDER — METHOCARBAMOL 750 MG/1
750 TABLET, FILM COATED ORAL EVERY 8 HOURS
Qty: 90 TABLET | Refills: 2 | Status: SHIPPED | OUTPATIENT
Start: 2025-02-27

## 2025-02-27 NOTE — TELEPHONE ENCOUNTER
Spoke to patient in reference to message, pt. wanted an earlier appt. I  explained to her that Dr. Jimenez filled Rx. On 01/31- 03/2 and that will be her correct mindi date. Patient had verbal understanding.   ----- Message from Jadyn sent at 2/27/2025  2:41 PM CST -----  Type:  Patient Returning CallWho Called:PtWould the patient rather a call back or a response via [facility]? Call back Best Call Back Number:[phone] Information: need to speak with the office about oxyCODONE-acetaminophen (PERCOCET)  mg per tabletPt states she have refill at the pharmacy but have a question about the refill

## 2025-03-14 ENCOUNTER — PATIENT MESSAGE (OUTPATIENT)
Dept: ENDOSCOPY | Facility: HOSPITAL | Age: 60
End: 2025-03-14
Payer: COMMERCIAL

## 2025-03-14 ENCOUNTER — TELEPHONE (OUTPATIENT)
Dept: ENDOSCOPY | Facility: HOSPITAL | Age: 60
End: 2025-03-14
Payer: COMMERCIAL

## 2025-03-14 NOTE — TELEPHONE ENCOUNTER
Called pt. To reschedule.Pt. did not come in for Colonoscopy today She did not answer call. Sent MY [facility] msg. To call Dept. To reschedule

## 2025-03-31 ENCOUNTER — OFFICE VISIT (OUTPATIENT)
Dept: INTERNAL MEDICINE | Facility: CLINIC | Age: 60
End: 2025-03-31
Payer: COMMERCIAL

## 2025-03-31 ENCOUNTER — APPOINTMENT (OUTPATIENT)
Dept: LAB | Facility: HOSPITAL | Age: 60
End: 2025-03-31
Payer: COMMERCIAL

## 2025-03-31 VITALS
HEART RATE: 79 BPM | HEIGHT: 60 IN | OXYGEN SATURATION: 99 % | WEIGHT: 140.63 LBS | SYSTOLIC BLOOD PRESSURE: 112 MMHG | BODY MASS INDEX: 27.61 KG/M2 | DIASTOLIC BLOOD PRESSURE: 68 MMHG

## 2025-03-31 DIAGNOSIS — J45.50 SEVERE PERSISTENT ASTHMA, UNSPECIFIED WHETHER COMPLICATED: ICD-10-CM

## 2025-03-31 DIAGNOSIS — J45.20 MILD INTERMITTENT CHRONIC ASTHMA WITHOUT COMPLICATION: ICD-10-CM

## 2025-03-31 DIAGNOSIS — J31.0 CHRONIC NONALLERGIC RHINITIS: ICD-10-CM

## 2025-03-31 DIAGNOSIS — Z11.3 SCREEN FOR STD (SEXUALLY TRANSMITTED DISEASE): ICD-10-CM

## 2025-03-31 DIAGNOSIS — G89.4 CHRONIC PAIN DISORDER: ICD-10-CM

## 2025-03-31 DIAGNOSIS — J32.9 SINUSITIS, UNSPECIFIED CHRONICITY, UNSPECIFIED LOCATION: Primary | ICD-10-CM

## 2025-03-31 DIAGNOSIS — D56.3 BETA THALASSEMIA MINOR: ICD-10-CM

## 2025-03-31 DIAGNOSIS — Z12.11 COLON CANCER SCREENING: ICD-10-CM

## 2025-03-31 PROCEDURE — 3008F BODY MASS INDEX DOCD: CPT | Mod: CPTII,S$GLB,, | Performed by: INTERNAL MEDICINE

## 2025-03-31 PROCEDURE — G2211 COMPLEX E/M VISIT ADD ON: HCPCS | Mod: S$GLB,,, | Performed by: INTERNAL MEDICINE

## 2025-03-31 PROCEDURE — 1159F MED LIST DOCD IN RCRD: CPT | Mod: CPTII,S$GLB,, | Performed by: INTERNAL MEDICINE

## 2025-03-31 PROCEDURE — 1160F RVW MEDS BY RX/DR IN RCRD: CPT | Mod: CPTII,S$GLB,, | Performed by: INTERNAL MEDICINE

## 2025-03-31 PROCEDURE — 87491 CHLMYD TRACH DNA AMP PROBE: CPT | Performed by: INTERNAL MEDICINE

## 2025-03-31 PROCEDURE — 99214 OFFICE O/P EST MOD 30 MIN: CPT | Mod: S$GLB,,, | Performed by: INTERNAL MEDICINE

## 2025-03-31 PROCEDURE — 3074F SYST BP LT 130 MM HG: CPT | Mod: CPTII,S$GLB,, | Performed by: INTERNAL MEDICINE

## 2025-03-31 PROCEDURE — 99999 PR PBB SHADOW E&M-EST. PATIENT-LVL V: CPT | Mod: PBBFAC,,, | Performed by: INTERNAL MEDICINE

## 2025-03-31 PROCEDURE — 3078F DIAST BP <80 MM HG: CPT | Mod: CPTII,S$GLB,, | Performed by: INTERNAL MEDICINE

## 2025-03-31 RX ORDER — AMOXICILLIN 500 MG/1
500 TABLET, FILM COATED ORAL EVERY 12 HOURS
Qty: 20 TABLET | Refills: 0 | Status: SHIPPED | OUTPATIENT
Start: 2025-03-31 | End: 2025-04-10

## 2025-03-31 RX ORDER — AZELASTINE 1 MG/ML
2 SPRAY, METERED NASAL 2 TIMES DAILY PRN
Qty: 30 ML | Refills: 4 | Status: SHIPPED | OUTPATIENT
Start: 2025-03-31 | End: 2026-03-31

## 2025-03-31 RX ORDER — FLUTICASONE PROPIONATE 110 UG/1
1 AEROSOL, METERED RESPIRATORY (INHALATION) EVERY 12 HOURS
Qty: 12 G | Refills: 3 | Status: SHIPPED | OUTPATIENT
Start: 2025-03-31

## 2025-03-31 RX ORDER — FLUTICASONE PROPIONATE 50 MCG
2 SPRAY, SUSPENSION (ML) NASAL DAILY
Qty: 16 ML | Refills: 6 | Status: SHIPPED | OUTPATIENT
Start: 2025-03-31

## 2025-03-31 RX ORDER — ALBUTEROL SULFATE 90 UG/1
2 INHALANT RESPIRATORY (INHALATION) EVERY 6 HOURS PRN
Qty: 18 G | Refills: 5 | Status: SHIPPED | OUTPATIENT
Start: 2025-03-31

## 2025-04-03 LAB
C TRACH DNA SPEC QL NAA+PROBE: NOT DETECTED
CTGC SOURCE (OHS) ORD-325: NORMAL
N GONORRHOEA DNA UR QL NAA+PROBE: NOT DETECTED

## 2025-04-04 ENCOUNTER — TELEPHONE (OUTPATIENT)
Dept: INTERNAL MEDICINE | Facility: CLINIC | Age: 60
End: 2025-04-04
Payer: COMMERCIAL

## 2025-04-04 ENCOUNTER — LAB VISIT (OUTPATIENT)
Dept: LAB | Facility: HOSPITAL | Age: 60
End: 2025-04-04
Payer: COMMERCIAL

## 2025-04-04 DIAGNOSIS — Z11.3 SCREEN FOR STD (SEXUALLY TRANSMITTED DISEASE): ICD-10-CM

## 2025-04-04 DIAGNOSIS — D56.3 BETA THALASSEMIA MINOR: ICD-10-CM

## 2025-04-04 LAB
ABSOLUTE EOSINOPHIL (OHS): 0.14 K/UL
ABSOLUTE MONOCYTE (OHS): 0.28 K/UL (ref 0.3–1)
ABSOLUTE NEUTROPHIL COUNT (OHS): 2.62 K/UL (ref 1.8–7.7)
ALBUMIN SERPL BCP-MCNC: 3.7 G/DL (ref 3.5–5.2)
ALP SERPL-CCNC: 76 UNIT/L (ref 40–150)
ALT SERPL W/O P-5'-P-CCNC: 8 UNIT/L (ref 10–44)
ANION GAP (OHS): 7 MMOL/L (ref 8–16)
AST SERPL-CCNC: 15 UNIT/L (ref 11–45)
BASOPHILS # BLD AUTO: 0.05 K/UL
BASOPHILS NFR BLD AUTO: 1.1 %
BILIRUB SERPL-MCNC: 0.3 MG/DL (ref 0.1–1)
BUN SERPL-MCNC: 9 MG/DL (ref 8–23)
CALCIUM SERPL-MCNC: 9 MG/DL (ref 8.7–10.5)
CHLORIDE SERPL-SCNC: 107 MMOL/L (ref 95–110)
CHOLEST SERPL-MCNC: 118 MG/DL (ref 120–199)
CHOLEST/HDLC SERPL: 2 {RATIO} (ref 2–5)
CO2 SERPL-SCNC: 26 MMOL/L (ref 23–29)
CREAT SERPL-MCNC: 0.8 MG/DL (ref 0.5–1.4)
EAG (OHS): 88 MG/DL (ref 68–131)
ERYTHROCYTE [DISTWIDTH] IN BLOOD BY AUTOMATED COUNT: 14.5 % (ref 11.5–14.5)
FERRITIN SERPL-MCNC: 34 NG/ML (ref 20–300)
GFR SERPLBLD CREATININE-BSD FMLA CKD-EPI: >60 ML/MIN/1.73/M2
GLUCOSE SERPL-MCNC: 88 MG/DL (ref 70–110)
HBA1C MFR BLD: 4.7 % (ref 4–5.6)
HBV SURFACE AG SERPL QL IA: NORMAL
HCT VFR BLD AUTO: 35.8 % (ref 37–48.5)
HDLC SERPL-MCNC: 59 MG/DL (ref 40–75)
HDLC SERPL: 50 % (ref 20–50)
HGB BLD-MCNC: 10.6 GM/DL (ref 12–16)
HIV 1+2 AB+HIV1 P24 AG SERPL QL IA: NORMAL
IMM GRANULOCYTES # BLD AUTO: 0.01 K/UL (ref 0–0.04)
IMM GRANULOCYTES NFR BLD AUTO: 0.2 % (ref 0–0.5)
IRON SATN MFR SERPL: 29 % (ref 20–50)
IRON SERPL-MCNC: 93 UG/DL (ref 30–160)
LDLC SERPL CALC-MCNC: 47 MG/DL (ref 63–159)
LYMPHOCYTES # BLD AUTO: 1.56 K/UL (ref 1–4.8)
MCH RBC QN AUTO: 22.1 PG (ref 27–31)
MCHC RBC AUTO-ENTMCNC: 29.6 G/DL (ref 32–36)
MCV RBC AUTO: 75 FL (ref 82–98)
NONHDLC SERPL-MCNC: 59 MG/DL
NUCLEATED RBC (/100WBC) (OHS): 0 /100 WBC
PLATELET # BLD AUTO: 261 K/UL (ref 150–450)
PMV BLD AUTO: 10.9 FL (ref 9.2–12.9)
POTASSIUM SERPL-SCNC: 3.2 MMOL/L (ref 3.5–5.1)
PROT SERPL-MCNC: 7.3 GM/DL (ref 6–8.4)
RBC # BLD AUTO: 4.79 M/UL (ref 4–5.4)
RELATIVE EOSINOPHIL (OHS): 3 %
RELATIVE LYMPHOCYTE (OHS): 33.5 % (ref 18–48)
RELATIVE MONOCYTE (OHS): 6 % (ref 4–15)
RELATIVE NEUTROPHIL (OHS): 56.2 % (ref 38–73)
SODIUM SERPL-SCNC: 140 MMOL/L (ref 136–145)
TIBC SERPL-MCNC: 326 UG/DL (ref 250–450)
TRANSFERRIN SERPL-MCNC: 220 MG/DL (ref 200–375)
TRIGL SERPL-MCNC: 60 MG/DL (ref 30–150)
WBC # BLD AUTO: 4.66 K/UL (ref 3.9–12.7)

## 2025-04-04 PROCEDURE — 87389 HIV-1 AG W/HIV-1&-2 AB AG IA: CPT

## 2025-04-04 PROCEDURE — 85025 COMPLETE CBC W/AUTO DIFF WBC: CPT

## 2025-04-04 PROCEDURE — 84478 ASSAY OF TRIGLYCERIDES: CPT

## 2025-04-04 PROCEDURE — 82728 ASSAY OF FERRITIN: CPT

## 2025-04-04 PROCEDURE — 87340 HEPATITIS B SURFACE AG IA: CPT

## 2025-04-04 PROCEDURE — 84466 ASSAY OF TRANSFERRIN: CPT

## 2025-04-04 PROCEDURE — 83036 HEMOGLOBIN GLYCOSYLATED A1C: CPT

## 2025-04-04 PROCEDURE — 80053 COMPREHEN METABOLIC PANEL: CPT

## 2025-04-04 PROCEDURE — 86592 SYPHILIS TEST NON-TREP QUAL: CPT

## 2025-04-04 PROCEDURE — 36415 COLL VENOUS BLD VENIPUNCTURE: CPT

## 2025-04-04 NOTE — TELEPHONE ENCOUNTER
2:12 PM  Went through G&C urine test results today. She has a new phone number - would like a call when the remaining labs have resulted. [phone] (XOG)

## 2025-04-05 LAB — RPR SER QL: NORMAL

## 2025-04-07 ENCOUNTER — TELEPHONE (OUTPATIENT)
Dept: ENDOSCOPY | Facility: HOSPITAL | Age: 60
End: 2025-04-07

## 2025-04-07 ENCOUNTER — CLINICAL SUPPORT (OUTPATIENT)
Dept: ENDOSCOPY | Facility: HOSPITAL | Age: 60
End: 2025-04-07
Attending: INTERNAL MEDICINE
Payer: COMMERCIAL

## 2025-04-07 ENCOUNTER — RESULTS FOLLOW-UP (OUTPATIENT)
Dept: INTERNAL MEDICINE | Facility: CLINIC | Age: 60
End: 2025-04-07

## 2025-04-07 VITALS — BODY MASS INDEX: 27.48 KG/M2 | WEIGHT: 140 LBS | HEIGHT: 60 IN

## 2025-04-07 DIAGNOSIS — Z12.11 COLON CANCER SCREENING: ICD-10-CM

## 2025-04-07 DIAGNOSIS — Z12.11 ENCOUNTER FOR SCREENING COLONOSCOPY: Primary | ICD-10-CM

## 2025-04-07 RX ORDER — POTASSIUM CHLORIDE 20 MEQ/1
TABLET, EXTENDED RELEASE ORAL
Qty: 4 TABLET | Refills: 0 | Status: SHIPPED | OUTPATIENT
Start: 2025-04-07

## 2025-04-07 NOTE — TELEPHONE ENCOUNTER
Referral for procedure from PAT appointment      Spoke to patient to schedule procedure(s) Colonoscopy       Physician to perform procedure(s) Dr. Lorenzo  Date of Procedure (s) 05/23/2025  Arrival Time 8:30 Am   Time of Procedure(s) 9:30 Am    Location of Procedure(s) [facility] [address] [facility]  Type of Rx Prep sent to patient: Suprep  Instructions provided to patient via [facility]/postal mail   [facility], [id number]    Patient was informed on the following information and verbalized understanding. Screening questionnaire reviewed with patient and complete. If procedure requires anesthesia, a responsible adult needs to be present to accompany the patient home, patient cannot drive after receiving anesthesia. Appointment details are tentative, especially check-in time. Patient will receive a prep-op call 7 days prior to confirm check-in time for procedure. If applicable the patient should contact their pharmacy to verify Rx for procedure prep is ready for pick-up. Patient was advised to call the scheduling department at [phone] if pharmacy states no Rx is available. Patient was advised to call the endoscopy scheduling department if any questions or concerns arise.       Endoscopy Scheduling Department

## 2025-04-07 NOTE — PLAN OF CARE
Referral for procedure from PAT appointment      Spoke to patient to schedule procedure(s) Colonoscopy       Physician to perform procedure(s) Dr. Lorenzo  Date of Procedure (s) 05/23/2025  Arrival Time 8:30 Am   Time of Procedure(s) 9:30 Am    Location of Procedure(s) [facility] [address] [facility]  Type of Rx Prep sent to patient: Suprep  Instructions provided to patient via [facility]    Patient was informed on the following information and verbalized understanding. Screening questionnaire reviewed with patient and complete. If procedure requires anesthesia, a responsible adult needs to be present to accompany the patient home, patient cannot drive after receiving anesthesia. Appointment details are tentative, especially check-in time. Patient will receive a prep-op call 7 days prior to confirm check-in time for procedure. If applicable the patient should contact their pharmacy to verify Rx for procedure prep is ready for pick-up. Patient was advised to call the scheduling department at [phone] if pharmacy states no Rx is available. Patient was advised to call the endoscopy scheduling department if any questions or concerns arise.      [facility] Scheduling Department

## 2025-04-08 RX ORDER — SODIUM, POTASSIUM,MAG SULFATES 17.5-3.13G
1 SOLUTION, RECONSTITUTED, ORAL ORAL DAILY
Qty: 1 KIT | Refills: 0 | Status: SHIPPED | OUTPATIENT
Start: 2025-04-08 | End: 2025-04-10

## 2025-04-23 ENCOUNTER — OFFICE VISIT (OUTPATIENT)
Dept: PAIN MEDICINE | Facility: CLINIC | Age: 60
End: 2025-04-23
Payer: COMMERCIAL

## 2025-04-23 ENCOUNTER — TELEPHONE (OUTPATIENT)
Dept: PAIN MEDICINE | Facility: CLINIC | Age: 60
End: 2025-04-23
Payer: COMMERCIAL

## 2025-04-23 ENCOUNTER — TELEPHONE (OUTPATIENT)
Dept: PAIN MEDICINE | Facility: CLINIC | Age: 60
End: 2025-04-23

## 2025-04-23 ENCOUNTER — LAB VISIT (OUTPATIENT)
Dept: LAB | Facility: HOSPITAL | Age: 60
End: 2025-04-23
Attending: NURSE PRACTITIONER
Payer: COMMERCIAL

## 2025-04-23 VITALS
HEART RATE: 74 BPM | HEIGHT: 60 IN | SYSTOLIC BLOOD PRESSURE: 121 MMHG | WEIGHT: 140.19 LBS | BODY MASS INDEX: 27.52 KG/M2 | DIASTOLIC BLOOD PRESSURE: 74 MMHG

## 2025-04-23 DIAGNOSIS — F11.90 CHRONIC, CONTINUOUS USE OF OPIOIDS: ICD-10-CM

## 2025-04-23 DIAGNOSIS — M54.16 LUMBAR RADICULOPATHY: ICD-10-CM

## 2025-04-23 DIAGNOSIS — F11.90 CHRONIC, CONTINUOUS USE OF OPIOIDS: Primary | ICD-10-CM

## 2025-04-23 DIAGNOSIS — G89.4 CHRONIC PAIN SYNDROME: ICD-10-CM

## 2025-04-23 DIAGNOSIS — G89.29 CHRONIC RADICULAR PAIN OF LOWER BACK: ICD-10-CM

## 2025-04-23 DIAGNOSIS — G89.29 CHRONIC MIDLINE THORACIC BACK PAIN: ICD-10-CM

## 2025-04-23 DIAGNOSIS — M47.816 LUMBAR SPONDYLOSIS: ICD-10-CM

## 2025-04-23 DIAGNOSIS — Z98.1 STATUS POST CERVICAL SPINAL FUSION: ICD-10-CM

## 2025-04-23 DIAGNOSIS — M54.16 CHRONIC RADICULAR PAIN OF LOWER BACK: ICD-10-CM

## 2025-04-23 DIAGNOSIS — M79.18 MYOFASCIAL PAIN SYNDROME OF THORACIC SPINE: ICD-10-CM

## 2025-04-23 DIAGNOSIS — Z98.1 STATUS POST LUMBAR SPINAL FUSION: ICD-10-CM

## 2025-04-23 DIAGNOSIS — M96.1 POST LAMINECTOMY SYNDROME: ICD-10-CM

## 2025-04-23 DIAGNOSIS — G89.4 CHRONIC PAIN DISORDER: ICD-10-CM

## 2025-04-23 DIAGNOSIS — G89.29 CHRONIC BILATERAL LOW BACK PAIN WITHOUT SCIATICA: ICD-10-CM

## 2025-04-23 DIAGNOSIS — M54.50 CHRONIC BILATERAL LOW BACK PAIN WITHOUT SCIATICA: ICD-10-CM

## 2025-04-23 DIAGNOSIS — M54.6 CHRONIC MIDLINE THORACIC BACK PAIN: ICD-10-CM

## 2025-04-23 PROCEDURE — 1160F RVW MEDS BY RX/DR IN RCRD: CPT | Mod: CPTII,S$GLB,, | Performed by: NURSE PRACTITIONER

## 2025-04-23 PROCEDURE — 99999 PR PBB SHADOW E&M-EST. PATIENT-LVL IV: CPT | Mod: PBBFAC,,, | Performed by: NURSE PRACTITIONER

## 2025-04-23 PROCEDURE — 3044F HG A1C LEVEL LT 7.0%: CPT | Mod: CPTII,S$GLB,, | Performed by: NURSE PRACTITIONER

## 2025-04-23 PROCEDURE — 3008F BODY MASS INDEX DOCD: CPT | Mod: CPTII,S$GLB,, | Performed by: NURSE PRACTITIONER

## 2025-04-23 PROCEDURE — G2211 COMPLEX E/M VISIT ADD ON: HCPCS | Mod: S$GLB,,, | Performed by: NURSE PRACTITIONER

## 2025-04-23 PROCEDURE — 1159F MED LIST DOCD IN RCRD: CPT | Mod: CPTII,S$GLB,, | Performed by: NURSE PRACTITIONER

## 2025-04-23 PROCEDURE — 80347 BENZODIAZEPINES 13 OR MORE: CPT

## 2025-04-23 PROCEDURE — 3078F DIAST BP <80 MM HG: CPT | Mod: CPTII,S$GLB,, | Performed by: NURSE PRACTITIONER

## 2025-04-23 PROCEDURE — 99214 OFFICE O/P EST MOD 30 MIN: CPT | Mod: S$GLB,,, | Performed by: NURSE PRACTITIONER

## 2025-04-23 PROCEDURE — 3074F SYST BP LT 130 MM HG: CPT | Mod: CPTII,S$GLB,, | Performed by: NURSE PRACTITIONER

## 2025-04-23 RX ORDER — OXYCODONE AND ACETAMINOPHEN 10; 325 MG/1; MG/1
1 TABLET ORAL 3 TIMES DAILY PRN
Qty: 90 TABLET | Refills: 0 | Status: SHIPPED | OUTPATIENT
Start: 2025-06-29 | End: 2025-07-29

## 2025-04-23 RX ORDER — OXYCODONE AND ACETAMINOPHEN 10; 325 MG/1; MG/1
1 TABLET ORAL 3 TIMES DAILY PRN
Qty: 90 TABLET | Refills: 0 | Status: SHIPPED | OUTPATIENT
Start: 2025-05-30 | End: 2025-06-29

## 2025-04-23 RX ORDER — OXYCODONE AND ACETAMINOPHEN 10; 325 MG/1; MG/1
1 TABLET ORAL 3 TIMES DAILY PRN
Qty: 90 TABLET | Refills: 0 | Status: SHIPPED | OUTPATIENT
Start: 2025-04-30 | End: 2025-05-30

## 2025-04-23 NOTE — TELEPHONE ENCOUNTER
Called patient back she was trying to be see now but Corby is out of the office until 1:00 and she is scheduled for 1:40. So I let her know that is the soonest we have available today. So she said she will be here at 1:40.    ----- Message from Debra sent at 4/23/2025  9:54 AM CDT -----  Type:  requesting a call Who Called: pt Would the patient rather a call back or a response via [facility]? Call Best Call Back Number: [phone] Information:

## 2025-04-23 NOTE — PROGRESS NOTES
[facility] Established Visit    Chief Complaint   Patient presents with    Follow-up     Medication refill          4/23/2025     1:53 PM 1/25/2025     9:28 AM 10/29/2024     9:14 AM   Last 3 PDI Scores   Pain Disability Index (PDI) 70 60 70     Interval update 04/23/25:  59 y/o female presents for Pain medication management/refill and discussion of recent MRI findings of the thoracic and L spine. Patient presents for a three-month follow-up visit, reporting ongoing upper and lower back pain. Her most severe pain is located under her breast and radiates downward. She reports increased pain severity, leading her to take extra doses of her prescribed pain medication. She currently has about four pills left of her medication. She had a recent MRI of her T spine, ordered by another physician, which reportedly showed abnormalities in her spine. She mentions seeing Dr. Gordon and having the MRI done at [facility] on [facility].    She denies any new medical diagnoses.    IMAGING:  Patient underwent MRI scans of the thoracic and lumbar spine at [facility] on [facility]. The results are pending detailed review.    MEDICATIONS:  Patient is on Percocet 3 times daily. She reports taking an extra dose due to increased pain, she states 50-60% relief and improved functionality       Interval Update 1/25/2025: Patient return to clinic for three month follow up on medication refill.  She is requesting a refill of her Percocet 10/325 t.i.d..  Patient states she continues to take these medications daily with benefit to her chronic pain symptoms.  She reports the medication provides her 70% relief of her symptoms and improves her functionality. Denies any new symptoms, weakness, bowel or bladder function changes, recent falls or trauma.      Interval update 10/29/24:Patient return to clinic for medication refill.  Patient has a history of chronic pain  "syndrome specifically low back pain with intermittent leg pain she also suffers from midback pain at times.  She is on chronic opioid therapy that consists of Percocet  t.i.d. 90 tablets per month which continue to provide her 60 70% relief of her symptoms and improvement in her functionality.  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  She denies any new pain denies any profound weakness denies any bowel or bladder dysfunction denies any saddle anesthesia denies any recent incident or trauma.    Interval Update 08/02/2024: Patient return to clinic for medication refill.  Patient continues to take Celebrex, Robaxin, and Percocet 10/325 t.i.d. p.r.n. to manage her pain.  She reports her pain is well-controlled with this regimen, and the medication improves her functionality..  She denies any side effects of the medications.  She states she was compliant with the an exercise regimen.  She denies any changes to her pain at this time.  She requests a refill of Robaxin, Celebrex, and Percocet.  She rates her pain 10/10 today.    Interval Update 06/05/2024: Patient returns to clinic for low back pain.  She presents with a "red spot in her mid back"   There is also blanching in the area of concern.  She does report some pain in the area upon palpation discussed with patient that this is not typically something that I treat an interventional pain recommended a dermatology referral for further evaluation.  She denies any signs and symptoms of infection  her vitals all within normal limits.    Interval History 5/3/2024:  58 y/o female presents for a follow iup apppt for medication refilil.,she has a hx of chronic pain synderome specifically her chronic low back pain she is currently on a stable low dose opioid regimen consists of Percocet  t.i.d. 90 tablets per month which continue to provide her 80-90% relief and improved functionality.  She did " report visits to her local gym, she has an established a home exercise plan that has helped her with lumbar stabilization and muscular strengthening.  She continues to take the chronic opioid therapy without any adverse side effects.    Interval History 4/4/2024:  59-year-old female presents today for a medication refill.  This is her 30 day follow-up she is currently on chronic opioid therapy that consists of Percocet  t.i.d. 90 tablets a month which she continues to report 80% relief when taking this medication with improved functionality.  She did acknowledge intermittently taking half a extra tablet to help with midback pain.  Discussed with patient that compliant with medications very important particularly with regard to chronic opioid therapy patient verbalized understanding.  She reported breakthrough pain in between her doses of Percocet I have recommended the use of extra Tylenol for breakthrough pain not to exceed 3000 mg in 24 hours.  As Tylenol is attached to her current opioid.  She denies any new pain denies profound weakness denies any bowel bladder dysfunction at this time.        Interval update 03/08/24 58-year-old female that presents today for a follow-up appointment for medication refill.  She is currently on chronic opioid therapy that consists of Percocet  t.i.d. 90 tablets per month she is reporting % relief when taking this medication and improved functionality she reports being able to perform her ADLs.  Denies any adverse side effects with this medication he would reports today increased neck pain over the last few weeks in the past I have prescribed her Celebrex which she states has been effective.  She denies any profound weakness denies any bowel bladder dysfunction denies saddle anesthesia denies any new pain today.      Interval update 02/08/24 58-year-old female that presents today for a follow-up appointment for medication refill.  She is currently on a stable low  dose opioid regimen  that consists of Percocet   t.I.d. 90 tablets per month that she reports continued provide her 80% relief with improved functionality.  Her primary source of pain is chronic low back with intermittent bilateral leg pain has a history of a lumbar few at L5-S1 that is intact this was performed in 2015.  She denies any adverse side effects with the chronic opioid regimen discussed that she would continue her last dose of pain medication was this morning.      Interval history 01/08/2024  58-year-old female that presents today for a follow-up appointment for medication refill.  She has a history of chronic low back and bilateral leg pain.  She has history of a previous posterior body fusion L5-S1 2015.  Additionally she has a C4-5 anterior cervical diskectomy and fusion in 2017.  She is currently on chronic opioid therapy that consists of Percocet  t.i.d. 90 tablets per month where she continues to report provides her relief at 80% with improved functionality.  She denies any adverse side effects with this medication.  Additionally she complained of continued midback pain that is worse following physical therapy.  She reports not wanting to return to physical therapy as she feels as though this exacerbates her symptoms.  Her and I discussed that PT is part of continued improvement in her functionality.      Interval history 12/07/2023:  58-year-old female with a history of chronic low back and bilateral leg pain.  She does have a history of a previous posterior and body fusion L5-S1 hardware is intact 2015.  Additionally she has a C4-C5 anterior cervical diskectomy and fusion in 2017.  She is currently being provided chronic opioid therapy that consists of Percocet  t.i.d. 90 tablets per month that she can report provides her 80-90% relief of her pain and improvement in functionality.  Her pain score today she reports as 10/10.      Interval History (11/02/2023) - Ms. Tucker is  following up for medication refill to manage chronic lbp pain.  A refill of Oxycodone-Acetaminophen  mg t.i.d. 90 tablets per month is being requested today.  She reports 100% relief with the current medication regimen.  She reports the following medication side effects: none.  Pain is currently rate 7/10 with a weekly range 7-8/10. Pt is also S/P TPI on 10/2 with 20% relief for 2 to 3 days.  She does report improved functionality when taking her chronic opioid medication.  She did report that her PCP ordered a thoracic MRI to rule out pathology she is scheduled for that in the upcoming weeks.      Interval history (10/02/2023):  returns today for follow up for medication refill currently she is on a stable low dose opioid regimen that consists of Norco  t.i.d. 90 tablets per month.  She continues to report 90% relief when taking this medication for chronic low back pain.  Today she is complaining of mid back pain onset 5-6 weeks she denies any radicular symptoms to the front of her chest.  She denies any recent incident or trauma denies any bowel bladder dysfunction at this time. Currently, the back pain is stable.        Current Pain Scales:  Current: 10/10              Typical Range: 10-10/10       Interval History(09/08/2023):  Chasity Tucker returns today for follow up medication refill. She is currently on a stable low dose opoid regimen that consist of Norco  TID # 90. She typically has been taking Percocet  TID however the supply was limited which is why she was taking/scribe Norco  t.i.d..  She reports that the Norco is not as helpful her previous Percocet prescription.  We discussed today we will consider switching her back to Percocet  t.i.d..  She reports 50-60% relief of her pain when taking her chronic opoid therapy, she denies any adverse SEs.  Currently, the back,leg pain is stable.      Current Pain Scales:  Current: 10/10              Typical Range:  10-10/10     Interval History (08/21/2023):  Chasity Tucker returns today for follow up.  Currently, the leg & bank pain is stable she is currently on a stable low dose opoid regimen that consist of Norco  t.i.d. 90 tablets she continues to report that this medication provides her with 50-60% relief of her symptoms and improvement in her functionality.  She was previously taking Percocet  t.i.d. however due to the shortage of Percocet at the pharmacy we switched her to [place]  t.i.d..  She denies any adverse side effects with any of her medications denies any new pain denies any profound weakness denies any saddle anesthesia at this time.  Of note she did report that she would like to switch back to Percocet  t.i.d. as this provided her better relief than the Norco .    Current Pain Scales:  Current: 10/10              Typical Range: 10-10/10     Interval History (07/24/2023):  Chasity Tucker returns today for follow up for chronic neck and low back pain.  She has not been seen since February of this year she is returning to this clinic due to an insurance change her current pain provider Dr. Maurice is unable to take her insurance so she would like to switch back to our office.  She does have a history of chronic pain and is currently taking chronic opioid therapy that consists of Percocet .  She does report that she has had a few injections based on my recollection she does have a history of a spine fusion C4-C5 anterior cervical discectomy and fusion 2017 and  Previous posterior and body fusion L5-S1.  Hardware is intact - 2015.        Current Pain Scales:  Current: 10/10              Typical Range: 10-10/10     Interval Updates: (02/16/2023):  Chasity Tucker returns today for follow up for medication refill. She is currently on a stable low does opoid regimen that consist Percocet 7.5-325 TID # 90  Currently, the the low back and  bilateral leg pain is worse. She would like to consider injections with our office, she was previously provided a caudal MIKALA per external provider/Dr. Salinas she she reports this has helped her pain in the past.  We also discussed frequent ED visits in the month of January she requested an increase in her chronic opioid therapy I explained to her that continuous increases in her chronic opioid therapy will not ultimately benefit her pain in that we should optimize other medications as well as attempt injections to help her.      Current Pain Scales:  Current: 9/10              Typical Range: 8-9/10     01/18/2023-Chasity Tucker is a 60 y.o. female who  has a past medical history of Allergy, Anemia, Anxiety, Asthma, Chronic back pain, Chronic neck pain, Depression, Disc, Full dentures, GERD (gastroesophageal reflux disease), Hypokalemia, Lumbar spondylosis (9/7/2022), and Recurrent upper respiratory infection (URI).  Patient presents requesting a medication refill she is currently on chronic opioid therapy that consists of Percocet 7.5-325 t.i.d. she reports 50% relief when taking this medication and improvement in her functionality.  She continues to report low back pain radiating into her hips bilaterally and radiating into her legs stopping just above her knees.  She denies any new pain denies any profound weakness, denies any recently incident or trauma, denies any bowel bladder dysfunction.      12/15/2022 - Ms. Tucker is following up for medication refill to manage chronic low back pain.  A refill of Oxycodone-Acetaminophen 7.5-325 mg TID PRN is being requested today.  She reports 100% relief with the current medication regimen with improved functionality. She reports   She reports the following medication side effects: none.  Pain is currently rate 10/10 with a weekly range 10-10/10.      11/17/2022 - Ms. Kelsey Tucker is following up for medication refill to manage chronic back pain.  A refill of  Oxycodone-Acetaminophen 7.5-325  TID # 90 mg is being requested today.  She reports 100% relief with the current medication regimen.  She reports the following medication side effects: none.  Pain is currently rate 10/10 with a weekly range 8-9/10.       10/6/2022 - Ms. Kelsey Tucker is following up for neck, right shoulder and low back pain. Pain is currently rate 10/10 with a weekly range 10-10/10.  Pain is described as sharp and stabbing with intermittent lumbar spasms. Pain is worse with any activity, she reports failing PT, she is reporting relief when taking perc 7.5 TID  she has been on this medication chronically per Dr. Salinas/external provider.     9/7/2022 - Ms. Tucker is following up for low back pain.  Pain is currently rate 10/10 with a weekly range 10-10/10.  It is described as Aching, Burning, and Sharp.       Chasity Tucker presents today complaining of low back bilateral leg pain, pain is been ongoing pain is constant, pain is aggravated with sitting, standing, bending walking, pain is mitigated with pain medicine.  This is my 1st clinic visit with this patient she is a former patient of /Danielle PM she was released from his practice due to her insurance not covering his network. She then saw Dr. Swan/[facility] an attempt to establish PM care. See his initial note below. She was provide a Rx of Percocet 7.5-325 # 45 pills to cover her pain and to prohibit withdrawal symptom. She  endorses not ever experiencing withdrawal symptoms despite being without medication for approximately 2 weeks. She reports only taking pain medication PRN, her previous dose that she reports provides her the most relief is Percocet  TID which she received for years per Dr. Salinas      Background from Chart Review  Per Dr. Swan's note-Original Pain Description:7/29/22  The pain is located in the lower back and radiates to both legs. The pain is described as aching and spasm. Exacerbating  factors: Sitting, Standing, Bending and Walking. Mitigating factors medicine. Symptoms interfere with daily activity and sleeping. The patient feels like symptoms have been unchanged. Patient denies night fever/night sweats, urinary incontinence, bowel incontinence, significant weight loss, significant motor weakness and loss of sensations.  Patient was seen by outside Pain Management, but presents to this clinic because her insurance changed.     Treatment History  PT/OT: 6 weeks of Conservative therapy (PT/Chiro/Home Exercises with Dates)  Mar 2022 to June 2022  HEP: not currently   TENS:  Injections: Yes - Epidurals from Dr. Salinas's office that never helped  Surgery:  -C4-C5 anterior cervical discectomy and fusion 2017  -Previous posterior and body fusion L5-S1.  Hardware is intact - 2015  Medications:   - NSAIDS:   - MSK Relaxants:   - TCAs:   - SNRIs:   - Topicals:   - Opioids: Percocet  TID   - Anticonvulsants:     Current Pain Medications  Percocet  TID # 90    Full Medication List    Current Outpatient Medications:     albuterol (PROVENTIL/VENTOLIN HFA) 90 mcg/actuation inhaler, Inhale 2 puffs into the lungs every 6 (six) hours as needed for Wheezing., Disp: 18 g, Rfl: 5    azelastine (ASTELIN) 137 mcg (0.1 %) nasal spray, 2 sprays (274 mcg total) by Nasal route 2 (two) times daily as needed for Rhinitis. Donot snort (because it tastes bad), Disp: 30 mL, Rfl: 4    brompheniramine-pseudoeph-DM (BROMFED DM) 2-30-10 mg/5 mL Syrp, , Disp: , Rfl:     budesonide-formoterol 160-4.5 mcg (SYMBICORT) 160-4.5 mcg/actuation HFAA, , Disp: , Rfl:     butalbital-acetaminophen-caffeine -40 mg (FIORICET, ESGIC) -40 mg per tablet, TAKE 1 TABLET BY MOUTH EVERY DAY AS NEEDED FOR HEADACHE, Disp: 30 tablet, Rfl: 2    celecoxib (CELEBREX) 200 MG capsule, Take 1 capsule (200 mg total) by mouth once daily., Disp: 30 capsule, Rfl: 1    ferrous sulfate 325 (65 FE) MG EC tablet, Take 1 tablet (325 mg total) by  mouth 2 (two) times daily with meals., Disp: , Rfl: 0    fluticasone propionate (FLONASE) 50 mcg/actuation nasal spray, 2 sprays (100 mcg total) by Each Nostril route once daily., Disp: 16 mL, Rfl: 6    fluticasone propionate (FLOVENT HFA) 110 mcg/actuation inhaler, Inhale 1 puff into the lungs every 12 (twelve) hours., Disp: 12 g, Rfl: 3    ipratropium (ATROVENT) 21 mcg (0.03 %) nasal spray, SPRAY 2 SPRAYS BY NASAL ROUTE 3 TIMES A DAY AS NEEDED FOR RHINITIS, Disp: 30 mL, Rfl: 6    LIDOcaine (LIDODERM) 5 %, Place 1 patch onto the skin once daily. Remove & Discard patch within 12 hours or as directed by MD, Disp: 30 patch, Rfl: 2    loratadine (CLARITIN) 10 mg tablet, Take 1 tablet (10 mg total) by mouth once daily., Disp: 60 tablet, Rfl: 5    methocarbamoL (ROBAXIN) 750 MG Tab, TAKE 1 TABLET BY MOUTH EVERY 8 HOURS, Disp: 90 tablet, Rfl: 2    naloxone (NARCAN) 4 mg/actuation Spry, 4mg by nasal route as needed for opioid overdose; may repeat every 2-3 minutes in alternating nostrils until medical help arrives. Call 911, Disp: 1 each, Rfl: 11    NEBULIZER ACCESSORIES MISC,  EA, Refills(s) 0, eRx: [id number], 1, Print SHEFALI Number, 1 device Please use as needed for wheezing Albuterol vials will be prescribed separately, 152 cm, cm, 04/18/20 23:36:00 CDT, Measured height in cm, 74.2, kg, 04/01/20 13:17:00 CDT, Meliton..., Disp: , Rfl:     omeprazole (PRILOSEC) 40 MG capsule, TAKE 1 CAPSULE (40 MG TOTAL) BY MOUTH EVERY MORNING., Disp: 90 capsule, Rfl: 3    oxyCODONE-acetaminophen (PERCOCET)  mg per tablet, Take 1 tablet by mouth 3 (three) times daily as needed for Pain., Disp: 90 tablet, Rfl: 0    potassium chloride SA (K-DUR,KLOR-CON) 20 MEQ tablet, Take one tablet daily for 4 days, Disp: 4 tablet, Rfl: 0    sodium chloride (OCEAN) 0.65 % nasal spray, 1 spray by Nasal route as needed for Congestion., Disp: 30 mL, Rfl: 0     ROS:  General: -fever, -chills, -fatigue, -weight gain, -weight loss, +excessive  crying  Eyes: -vision changes, -redness, -discharge  ENT: -ear pain, -nasal congestion, -sore throat  Cardiovascular: -chest pain, -palpitations, -lower extremity edema  Respiratory: -cough, -shortness of breath  Gastrointestinal: -abdominal pain, -nausea, -vomiting, -diarrhea, -constipation, -blood in stool  Genitourinary: -dysuria, -hematuria, -frequency  Musculoskeletal: -joint pain, -muscle pain, +back pain  Skin: -rash, -lesion  Neurological: -headache, -dizziness, -numbness, -tingling  Psychiatric: -anxiety, -depression, -sleep difficulty           Medical History  Past Medical History:   Diagnosis Date    Allergy     Anemia     Anxiety     Asthma     Chronic back pain     Chronic neck pain     Depression     Disc     Full dentures     GERD (gastroesophageal reflux disease)     Hypokalemia     Lumbar spondylosis 2022    Recurrent upper respiratory infection (URI)         Surgical History  Past Surgical History:   Procedure Laterality Date    BACK SURGERY      CERVICAL SPINE SURGERY       SECTION      removal foreign body L Leg      REMOVAL OF HARDWARE FROM SPINE N/A 2019    Procedure: REMOVAL, HARDWARE, SPINE;  Surgeon: Edmar Milner MD;  Location: [facility];  Service: Orthopedics;  Laterality: N/A;  [employer]     right shoulder surgery      SPINAL FUSION      TOTAL ABDOMINAL HYSTERECTOMY W/ BILATERAL SALPINGOOPHORECTOMY          Physical Exam      General Musculoskeletal Exam   Gait: normal     Back (L-Spine & T-Spine) / Neck (C-Spine) Exam     Tenderness Posterior midline palpation reveals tenderness of the Upper L-Spine, Lower T-Spine and Upper T-Spine. Right paramedian tenderness of the Lower T-Spine, Upper L-Spine, Upper T-Spine and Upper C-Spine. Left paramedian tenderness of the Lower T-Spine, Upper L-Spine, Lower C-Spine, Upper T-Spine and Upper C-Spine.     Back (L-Spine & T-Spine) Range of Motion   Lateral bend right:  abnormal   Lateral bend left:  abnormal   Rotation right:   abnormal   Rotation left:  abnormal     Comments:  TTP mid back       Muscle Strength   Right Lower Extremity   Quadriceps:  5/5   Gastrocsoleus:  5/5   Left Lower Extremity   Quadriceps:  5/5   Gastrocsoleus:  5/5       Vitals:    04/23/25 1356   BP: 121/74   Pulse: 74   Weight: 63.6 kg (140 lb 3.4 oz)   Height: 5' (1.524 m)   PainLoc: Back       Imaging  XR LUMBAR SPINE COMPLETE 5 VIEW     CLINICAL HISTORY:  Back pain or radiculopathy, prior surgery, new symptoms;Dorsalgia, unspecified     TECHNIQUE:  AP, lateral, spot and bilateral oblique views of the lumbar spine were performed.     COMPARISON:  11/08/2019     FINDINGS:  Previous posterior and body fusion L5-S1.  Hardware is intact.     Lumbar vertebral body heights are maintained.  Remaining disc spaces are maintained.  Degenerative facet arthropathy L3-4 and L4-5.  Slight grade 1 anterolisthesis L4 on L5.     MRI LUMBAR SPINE WITHOUT CONTRAST     CLINICAL HISTORY:  Low back pain, prior surgery, new symptoms;     TECHNIQUE:  Multiplanar, multisequence MR images were acquired from the thoracolumbar junction to the sacrum without the administration of contrast.     COMPARISON:  MR L-spine 09/23/2022,     FINDINGS:  Artifact is present, this diminishes image quality and diminishes the sensitivity of the examination.     Postoperative change of posterior instrumented fusion at L5-S1 with interbody spacer and bilateral laminectomy at L5.  Associated susceptibility artifact limits evaluation of surrounding structures.     Lumbar spine alignment is stable.  There is no evidence for high-grade spondylolisthesis, there is no evidence for high-grade or acute compression fracture deformity.  The vertebral body heights are well maintained, with no fracture.  No marrow signal abnormality suspicious for an infiltrative process.     When accounting for artifact, the conus is normal in appearance, and terminates at the L1-L2 level.  The adjacent soft tissue structures show no  significant abnormalities.     Mild multilevel degenerative disc disease with minimal height loss and desiccation.There are findings of lumbar spondylosis, not significantly changed compared to recent prior.     T12-L1: No spinal canal stenosis or neural foraminal narrowing.     L1-L2: No spinal canal stenosis or neural foraminal narrowing.     L2-L3: Diffuse disc bulge.  No spinal canal stenosis or neural foraminal narrowing.     L3-L4: Diffuse disc bulge with facet arthropathy, contribute to mild bilateral foraminal narrowing.  No spinal canal stenosis.     L4-L5: Diffuse disc bulge with facet arthropathy, contribute to mild bilateral neural foraminal narrowing.  No spinal canal stenosis.     L5-S1: Postoperative changes as above.  Central canal is posteriorly decompressed.  Bilateral facet arthropathy.  Mild neural foraminal narrowing.     Impression:     Postoperative change of posterior instrumented fusion at L5-S1 with interbody spacer and bilateral laminectomy at L5.     Mild lumbar spondylosis, not significantly changed compared to recent prior MR. No high-grade neural foraminal narrowing or spinal canal stenosis.     Electronically signed by resident: Lokesh Nguyen  Date:                                            12/23/2022  Time:                                           02:43     Electronically signed by:Sean Fleming  Date:                                            12/23/2022  Labs:  BMP  Lab Results   Component Value Date     04/04/2025    K 3.2 (L) 04/04/2025     04/04/2025    CO2 26 04/04/2025    BUN 9 04/04/2025    CREATININE 0.8 04/04/2025    CALCIUM 9.0 04/04/2025    ANIONGAP 7 (L) 04/04/2025    ESTGFRAFRICA >60.0 02/08/2022    EGFRNONAA >60.0 02/08/2022     Lab Results   Component Value Date    ALT 8 (L) 04/04/2025    AST 15 04/04/2025    ALKPHOS 76 04/04/2025    BILITOT 0.3 04/04/2025       Assessment:  Problem List Items Addressed This Visit          Psychiatric    Chronic,  continuous use of opioids - Primary    Relevant Orders    Pain Clinic Drug Screen       08/23/2022 - Chasity Tucker is a 60 y.o. female who  has a past medical history of Allergy, Anemia, Anxiety, Asthma, Chronic back pain, Chronic neck pain, Depression, Disc, Full dentures, GERD (gastroesophageal reflux disease), Hypokalemia, Lumbar spondylosis (9/7/2022), and Recurrent upper respiratory infection (URI).  By history and examination this patient has chronic low back pain with radiculopathy.  The underlying cause cause is facet arthritis, degenerative disc disease, foraminal stenosis, central canal stenosis, muscles strain and deconditioning.  Pathology is confirmed by imaging.  We discussed the underlying diagnoses and multiple treatment options including non-opioid medications, opioid medications, interventional procedures, physical therapy, home exercise and core muscle enhancement.  The risks and benefits of each treatment option were discussed and all questions were answered.      Patient has a 7 year history of low back pain she is status post L5-S1 laminectomy with a posterior fusion and has continued on chronic opioids without tapering.  She is now disabled and dependent upon opioids.  I discussed that we will provide her with a short term prescription, but that she will need to return to clinic in 2 weeks to meet Dr. Bourne and discuss chronic opioid therapy.    9/7/22 - Today is my first day seeing this patient in [place].  On exam she is exquisitely TTP around the hips, low back, and upper buttock regions bordering on an exaggerated pain response.  Her symptoms seem more consistent with myofascial pain rather than radicular pain though there may be a component of facet arthropathy above the fusion.  Toward better understanding her pathology, I will order an MRI of the lumbar spine.  Patient was advised that continued treatment and management is predicated on working with me to find and treat  the underlying cause of her pain ultimately toward eliminating chronic opioid therapy.  Records from Dr. Salinas's office reviewed today.      10/6/2022- 57-year-old female with a history of chronic low back pain she does have a history of L5-S1 laminectomy with posterior fusion, she was continued on chronic opioid therapy current external provider Dr. Salinas.  Her pain today seems to be centered around her hips, low back she also complained of upper buttocks regional pain.  She denies any radicular symptoms I do believe there may be a facetogenic component to her pain above her fusion.  I have not received records from her previous provider today I reviewed her lumbar MRI that was unremarkable except for lumbar facet arthropathy at L4-5 the MRI did not show any central or neural foraminal stenosis.  She continues to request Percocet 7.5-325 t.i.d. stating that this is the only thing that is helping her pain.  She refuses to return to physical therapy although after long discussion she is willing to return. I have discussed that Dr. Bourne will be leaving the practice and the goal for her should be to wean off of her chronic opioid therapy, patient was advised on last clinic visit with Dr. Bourne that continued treatment and management is all predicated on us finding the underlying cause of her pain and eliminating chronic opioid therapy.    11/17/20229225-14-qets-old female with a history of chronic low back pain she also has history of L5-S1 laminectomy with posterior fusion.  She is currently on chronic opioid therapy that consists of Percocet 7.5-325 t.i.d. 90 pills per month she presents today for medication refill for review of her [place]  and recent UDS found to be compliant.  Discussed I will refill her medications today.  Also refer her to physical therapy as I do believe this will help with lumbar stabilization and muscular strengthening patient verbalized understanding and agreed.  Lastly she is reporting  % relief when taking her pain medication Ms. Last for 4-5 hours and then her pain returns.    12/15/2022-56 y/o female with a hx of chronic low back syndrome( fusion of L5-S1 ) presented today for  a medication refill, she is currently on a stable low dose opioid regimen that consists of Percocet 7.5-325 t.i.d. p.r.n. for mainly chronic low back pain, she has not  shown any aberrant  behavior regarding her chronic opoid therapy. I discussed that Dr. Bourne will be leaving the practice and that she will need to meet the  new Pain Physician to discuss future chronic opoid therapy refills, patient verbalized understanding and agreed.     01/18/20238399-67-nvcy-old female with a history of chronic low back syndrome, she has a history of lumbar fusion L5-S1 she also has a history of C4-C5 anterior cervical discectomy and fusion 2017. She complains of multifactorial pain in her midback, low back and bilateral leg stopping just below her knees, we continue to manage her pain with chronic opioid therapy that consists of Percocet 7.5-325 t.i.d. she reports 50% relief when taking this medication improved functionality.  She is not shown any aberrant behavior with regard to her pain medication however she has a history of frequent ED visits.  I discussed her lumbar MRI in detail her previous pain provider was providing her with intermittent lumbar MIKALA based on her recent lumbar MRI do not recommend a lumbar MIKALA at this time.  See plan below that was agreed upon and discussed today.      02/16/20238620-10-krsa-old female with a history of chronic low back and lumbar radiculopathy she does have history of L5-S1 laminectomy with a posterior fusion.  She is currently on chronic opioid therapy with our office that consists of Percocet 7.5-325 t.i.d. 90 pills per month which she reports provides with 30-40% relief.  She previously received epidural injections specifically a caudal MIKALA from external pain provider in addition to  receiving chronic opioid therapy which in combination helped her symptoms.  Today I recommended we start low-dose Lyrica at 25 mg daily with the goal of increasing this as tolerable.  We will also schedule her for caudal MIKALA which I think will also reduce her symptoms.  As I discussed with her my goal is to reduce her ED visits for low back and bilateral leg pain patient verbalized understanding and agreed.      07/24/20235072-20-bldd-old female with history of chronic neck and low back pain.  She does have a history of a L5-S1 laminectomy with posterior fusion.  Additionally she has a history of a cervical anterior fusion at C4-5.  She is currently being seen by a pain provider outside of the [facility] system Dr. Maurice.  She did report that he no longer takes her insurance and needed to switch back to the [facility] system for pain management.  She is currently on chronic opioid therapy with his office, I will need to request medical records before we can continue chronic opioid therapy.  Also discussed I will need to obtain a UDS today as she will be returning to [facility].     8/21/2023-Chasity Carreonankita Tucker is a 60 y.o. female who  has a past medical history of Allergy, Anemia, Anxiety, Asthma, Chronic back pain, Chronic neck pain, Depression, Disc, Full dentures, GERD (gastroesophageal reflux disease), Hypokalemia, Lumbar spondylosis (9/7/2022), and Recurrent upper respiratory infection (URI).  By history and examination this patient has chronic low back pain with radiculopathy.  The underlying cause cause is facet arthritis, degenerative disc disease, foraminal stenosis, central canal stenosis, and deconditioning.  Pathology is confirmed by imaging.  We discussed the underlying diagnoses and multiple treatment options including non-opioid medications, physical therapy, core muscle enhancement, activity modification, and weight loss.  The risks and benefits of each treatment option were  discussed and all questions were answered.      9/8/[id number]-xfqp-old female with history of chronic low back and bilateral leg pain she does have history of lumbar surgery L5-S1 laminectomy with posterior fusion.  She is currently on chronic opioid therapy that reduces her symptoms by 50-60% and improves her functionality.  Upon review of her  and recent UDS they are compliant.    10/02/2023-Chasity Tucker is a 60 y.o. female who  has a past medical history of Allergy, Anemia, Anxiety, Asthma, Chronic back pain, Chronic neck pain, Depression, Disc, Full dentures, GERD (gastroesophageal reflux disease), Hypokalemia, Lumbar spondylosis (9/7/2022), and Recurrent upper respiratory infection (URI).  By history and examination this patient has chronic low back pain with radiculopathy.  The underlying cause cause is facet arthritis, degenerative disc disease, muscle dysfunction, muscles strain, and deconditioning.  Pathology is confirmed by imaging.  We discussed the underlying diagnoses and multiple treatment options including non-opioid medications, opioid medications, interventional procedures, physical therapy, home exercise, core muscle enhancement, activity modification, and weight loss.  The risks and benefits of each treatment option were discussed and all questions were answered.      11/02/20238049-65-azso-old female with a history of chronic low back pain she also is reporting midback pain.  She is a history of a lumbar fusion at L5-S1 with laminectomy.  Additionally she has history of a cervical anterior fusion at C4-5.  We have been providing her chronic opioid therapy Percocet  t.i.d. 90 tablets per month that she continues to report improved her symptoms by % with improved functionality.  Based on my history and exam today she has been having more chronic midback pain, an MRI was ordered of her mid back by her PCP discussed that we will review these images on her follow-up  appointment.  I will continue to support her chronic opioid therapy as she has not shown any aberrant behavior regard to her medications.    12/07/20234015-87-ijnc-old female with history of chronic low back pain.  She has a history of lumbar fusion L5-S1 with laminectomy.  In addition she has a cervical anterior fusion at C4-5.  We are going to provide her chronic opioid therapy that consists of Percocet  t.i.d. 90 tablets per month that continued to improve her symptoms and improve her functionality.  She reports 89% relief when taking this medication she denies any adverse side effects with the medication.    01/08/20244356-07-dqwk-old female with history of chronic low back pain bilateral leg pain.  She has a history of a lumbar fusion L5-S1 with laminar she also has a history of a cervical anterior fusion at C4-5 we are currently providing her chronic opioid therapy that consists of Percocet  t.i.d. 90 tablets per month which continue to provide her with 70% relief and improvement in her functionality.  Based on history and exam she did complain of tenderness in the mid back area I previously ordered a MRI of her thoracic as she did complain of radicular symptoms to the front of her chest.  She has not obtain this image discussed that she should continue with physical therapy and home stretching.  I do not see that this is an acute issue  Visit we will consider trigger point injections with steroids.      02/08/2024 - 58-year-old female with a history of chronic low back and bilateral leg pain she did report that her bilateral leg pain is intermittent.  She does have history of a lumbar fusion L5-S1 she also has a history of a cervical anterior fusion at C4-5.  We are currently providing her a stable low dose opioid regimen that consists of  Percocet  t.I.d. 90 tablets per month which she reports provided her 80-90% relief and improved functionality that allows her to perform her ADLs.  Her last dose  of medication was this morning.  Patient's history and exam she continues to complain of mid to low back pain with intermittent bilateral leg pain.  She is never shown any aberrant behavior with regard to her chronic opioid therapy discussed that we will continue.    03/08/20249480-92-tfrk-old female with a history of chronic low back and bilateral leg pain she does have history of a lumbar fusion L5-S1 in addition she has a history of a cervical anterior fusion at C4-5.  We are currently providing her chronic opioid therapy consists of Percocet  t.i.d. 90 tablets per month she reports continued relief at % when taking this medication for 5-6 hours this improves her functionality allows her to have a better quality of life.  She did report increased neck pain today that I think is related cervical myofascial syndrome and facet arthritis adjacent to her C4-5 fusion.  She reports flare-ups intermittently discussed I will provide her with Celebrex which she has used in the past that was helpful in reducing her neck pain.  She is never shown any aberrant behavior regard to her chronic opioid therapy to so we will refill her medication.      4/4/2024:  59-year-old woman with a history of chronic low back and bilateral leg pain she does have a history of a lumbar fusion L5-S1 in addition she has a cervical anterior fusion at C4-5 we are providing her chronic opioid therapy that consists of Percocet  t.i.d. 90 tablets per month which continue to provide her 70% relief her symptoms with improved functionality.  She is never shown any aberrant behavior with regard to her chronic opioid therapy discussed that we will continue she did acknowledge taking an extra half of a tablet in between her current t.i.d. dosing I recommended the patient to utilize Tylenol extra-strength as being compliant with her chronic opioid therapy is imperative.  Also educated patient she should not exceed 3000 mg in 25.  Of Tylenol  patient verbalized understanding see plan agreed upon below.    05/03/20244522-29-cmcb-old female with a history of chronic low back and bilateral leg pain she does have a history of lumbar fusion L5-S1 in addition she has a cervical anterior fusion at C4-5.  We are providing her chronic opioid therapy for chronic pain syndrome she is currently on a stable low dose opioid regimen consists of Percocet  t.i.d. 90 tablets per month which continue to provide her 80-90% relief of her symptoms and improvement in her functionality.  She is never shown any aberrant to her chronic opioid therapy discussed with the patient today that I will put her on a three-month cycle for her chronic pain medication patient verbalized understanding and agreed.      06/05/2024 -59-year-old female that presents today with  midback red spot that appears to be sensitive to touch,  discussed with patient's I will refer her to dermatology as  her etiology is unclear I am also recommended that she follow up with her PCP.  Patient verbalized understanding and agreed.    10/29/7641-04-fbzv-old female with a history of chronic pain syndrome specifically chronic low back pain with intermittent leg pain.  She does have history of a lumbar fusion at L5-S1 additionally she has a history of a cervical anterior fusion at C4-5.  We are currently providing her chronic opioid therapy that consists of Percocet 10-87011 tablets a month which she continues to report provides a 60 70% relief of her symptoms and improved functionality.  She has not shown any aberrant behavior with regard to her chronic opioid therapy.  Recommended we continue with chronic opioid therapy see plan agreed upon below.    01/25/2025 - patient presents today for follow up visit in for refill of her chronic pain medications.  She reports 70% improvement in her pain with current medication regimen consisting of Percocet 10/325 t.i.d. p.r.n.  I did  the patient on the side  effects of long-term opioid use.  I do recommend weaning this medication in the future if tolerated.  Patient declines a reduction in her medication at today's visit.  I did also recommend physical therapy and procedural interventions such as medial branch blocks/RFA for her low back pain.  Patient declines at this time.    4/23/2025-Chasity Tucker is a 60 y.o. female who  has a past medical history of Allergy, Anemia, Anxiety, Asthma, Chronic back pain, Chronic neck pain, Depression, Disc, Full dentures, GERD (gastroesophageal reflux disease), Hypokalemia, Lumbar spondylosis (9/7/2022), and Recurrent upper respiratory infection (URI).  By history and examination this patient has chronic mid to low back pain without radiculopathy.   The underlying  cause is facet arthritis, degenerative disc disease, foraminal stenosis, central canal stenosis, and deconditioning.  Pathology is confirmed by imaging pending review of new images done at [facility] that I will need to review.   We discussed the underlying diagnoses and multiple treatment options including non-opioid medications, opioid medications, interventional procedures, physical therapy, home exercise, core muscle enhancement, activity modification, and weight loss.  The risks and benefits of each treatment option were discussed and all questions were answered.        Treatment Plan:   Assessment & Plan    BACK PAIN (OTHER DORSALGIA / PAIN IN THORACIC SPINE / LOW BACK PAIN):  - Discussed potential injection for back pain if MRI results indicate it is necessary.  - Patient agreed to consider injection after reviewing MRI results.  - Follow up virtually in 1 month to discuss MRI results and consider injection therapy.  -patient declined PT     LONG TERM USE OF OPIATE ANALGESIC:  - Refill Percocet 10/325 t.i.d. p.r.n 3 month panel   - Follow up with Dr. Devine at [facility] once a year for pain management prescription.  - Provide urine sample for drug  screening.   - I did discuss with the patient that I do not recommend chronic opioid medications for low back pain or neck pain as they have not be shown to be superior to nonopioid treatments and can lead to worse outcomes in the long term. Patient counseled about the side effects of long term use of opioids including dependence, tolerance, addiction, respiratory depression, somnolence, and immune and endocrine dysfunction. -  Reviewed and consistent with medication use as prescribed.    RTC 1 month virtually to discuss MRI done at [facility] for ongoing care      KYA Burciaga  Interventional Pain Management    This note was generated with the assistance of ambient listening technology. Verbal consent was obtained by the patient and accompanying visitor(s) for the recording of patient appointment to facilitate this note. I attest to having reviewed and edited the generated note for accuracy, though some syntax or spelling errors may persist. Please contact the author of this note for any clarification.    4/23/2025

## 2025-04-23 NOTE — TELEPHONE ENCOUNTER
Ms. Tucker wanted to know if her medication was sent to pharmacy. I informed patient that it was sent to Dr. Jimenez and we are just waiting for her to sign off on the Rx. Ms. Payton had verbal understanding.   ----- Message from Brandee sent at 4/23/2025  4:09 PM CDT -----  Type:  RX Refill RequestWho Called:  Pt Refill or New Rx: Refill RX Name and Strength:oxyCODONE-acetaminophen (PERCOCET)  mg per tablet Preferred Pharmacy with phone number: [facility]/pharmacy #[id number] - [place], [place] - [id number] [place] [address]K[place]: [phone] Fax: [phone] or Mail Order: Local Ordering Provider: [facility] Would the patient rather a call back or a response via [facility]? Call Best Call Back Number:[phone] Additional Information:

## 2025-04-25 ENCOUNTER — OFFICE VISIT (OUTPATIENT)
Dept: PAIN MEDICINE | Facility: CLINIC | Age: 60
End: 2025-04-25
Payer: COMMERCIAL

## 2025-04-25 ENCOUNTER — PATIENT MESSAGE (OUTPATIENT)
Dept: NEUROLOGY | Facility: CLINIC | Age: 60
End: 2025-04-25
Payer: COMMERCIAL

## 2025-04-25 DIAGNOSIS — M47.816 LUMBAR SPONDYLOSIS: ICD-10-CM

## 2025-04-25 DIAGNOSIS — G89.29 CHRONIC BILATERAL LOW BACK PAIN WITHOUT SCIATICA: ICD-10-CM

## 2025-04-25 DIAGNOSIS — M54.6 CHRONIC MIDLINE THORACIC BACK PAIN: ICD-10-CM

## 2025-04-25 DIAGNOSIS — M54.16 LUMBAR RADICULOPATHY: Primary | ICD-10-CM

## 2025-04-25 DIAGNOSIS — G89.29 CHRONIC RADICULAR PAIN OF LOWER BACK: ICD-10-CM

## 2025-04-25 DIAGNOSIS — G89.29 CHRONIC MIDLINE THORACIC BACK PAIN: ICD-10-CM

## 2025-04-25 DIAGNOSIS — M54.16 CHRONIC RADICULAR PAIN OF LOWER BACK: ICD-10-CM

## 2025-04-25 DIAGNOSIS — M54.50 CHRONIC BILATERAL LOW BACK PAIN WITHOUT SCIATICA: ICD-10-CM

## 2025-04-25 NOTE — PROGRESS NOTES
Audio Only Telehealth Visit     The patient location is: Home   The chief complaint leading to consultation is: mid to low back   Visit type: Virtual visit with audio only (telephone)  Total time spent in medical discussion with patient: 20  minutes  Total time spent on date of the encounter:25 minutes       The reason for the audio only service rather than synchronous audio and video virtual visit was related to technical difficulties or patient preference/necessity.       Each patient to whom I provide medical services by telemedicine is:  (1) informed of the relationship between the physician and patient and the respective role of any other health care provider with respect to management of the patient; and (2) notified that they may decline to receive medical services by telemedicine and may withdraw from such care at any time. Patient verbally consented to receive this service via voice-only telephone call.       HPI:   History of Present Illness    CHIEF COMPLAINT:  Back pain    HPI:  Patient presents for discussion of MRI results and ongoing back pain. MRI was performed on 10/30/2024. She reports severe upper back pain, particularly under the bra strap area. Pain radiates down the legs, primarily to the knees on the right side, sometimes affecting both legs, causing near falls. She describes the pain as centralized in the spine, sudden in onset, and extending to the front of the waistline, spine, lower back, and down the right leg. She denies pain below the knees. There is concern about disc issues. She has a history of lumbar fusion at L5-S1 and is in communication with a pain management provider regarding back pain. She expresses a strong desire for pain relief.    IMAGING:  Patient underwent an MRI of the lumbar spine on 10/30/2024. The results showed surgical hardware at L5-S1 with fusion appearing intact. Disc bulges were observed at L3-L4, and arthritis was noted at L4-L5. Additionally, some disc space  narrowing was detected in the neck region.    SURGICAL HISTORY:  Patient has a history of lumbar fusion at L5-S1, with surgical hardware in place.      ROS:  General: -fever, -chills, -fatigue, -weight gain, -weight loss  Eyes: -vision changes, -redness, -discharge  ENT: -ear pain, -nasal congestion, -sore throat  Cardiovascular: -chest pain, -palpitations, -lower extremity edema  Respiratory: -cough, -shortness of breath  Gastrointestinal: -abdominal pain, -nausea, -vomiting, -diarrhea, -constipation, -blood in stool  Genitourinary: -dysuria, -hematuria, -frequency  Musculoskeletal: -joint pain, -muscle pain, +back pain, +limb pain  Skin: -rash, -lesion  Neurological: -headache, -dizziness, -numbness, -tingling  Psychiatric: -anxiety, -depression, -sleep difficulty              Assessment and plan:    LOW BACK PAIN:  - Discussed and ordered epidural injection at L3-4 or L4-5 to help with back pain.  - Patient agreed to the procedure.   -I will scheduled for lumbar MIKALA targeting L4-5    RTC 2-3 weeks after injection for ongoing care.       This service was not originating from a related E/M service provided within the previous 7 days nor will  to an E/M service or procedure within the next 24 hours or my soonest available appointment.  Prevailing standard of care was able to be met in this audio-only visit.

## 2025-04-27 LAB
1OH-MIDAZOLAM UR QL SCN: NOT DETECTED
6MAM UR QL: NOT DETECTED
7AMINOCLONAZEPAM UR QL: NOT DETECTED
A-OH ALPRAZ UR QL: NOT DETECTED
ALPRAZ UR QL: NOT DETECTED
AMPHET UR QL SCN: NOT DETECTED
ANNOTATION COMMENT IMP: NORMAL
AR NOROXYMORPHONE (CUTOFF 100 NG/ML): NOT DETECTED
AR OXYMORPHONE (CUTOFF 40 NG/ML): PRESENT
BARBITURATES UR QL: NORMAL
BUPRENORPHINE UR QL: NOT DETECTED
BZE UR QL: NEGATIVE
CARBOXYTHC UR QL: NEGATIVE
CARISOPRODOL UR QL: NEGATIVE
CLONAZEPAM UR QL: NOT DETECTED
CODEINE UR QL: NOT DETECTED
CREAT UR-MCNC: 127 MG/DL
DIAZEPAM UR QL: NOT DETECTED
ETHYL GLUCURONIDE UR QL: NORMAL
FENTANYL UR QL: NOT DETECTED
GABAPENTIN UR QL CFM: NOT DETECTED
HYDROCODONE UR QL: NOT DETECTED
HYDROMORPHONE UR QL: NOT DETECTED
LORAZEPAM UR QL: NOT DETECTED
MDA UR QL: NOT DETECTED
MDEA UR QL: NOT DETECTED
MDMA UR QL: NOT DETECTED
ME-PHENIDATE UR QL: NOT DETECTED
METHADONE UR QL: NEGATIVE
METHAMPHET UR QL: NOT DETECTED
MIDAZOLAM UR QL SCN: NOT DETECTED
MORPHINE UR QL: NOT DETECTED
NALOXONE UR QL CFM: NOT DETECTED
NORBUPRENORPHINE UR QL CFM: NOT DETECTED
NORDIAZEPAM UR QL: NOT DETECTED
NORFENTANYL UR QL: NOT DETECTED
NORMEPERIDINE UR QL CFM: NOT DETECTED
NOROXYCODONE UR QL CFM: NOT DETECTED
NOROXYMORPHONE UR QL SCN: PRESENT
OXAZEPAM UR QL: NOT DETECTED
OXYCODONE UR QL: NOT DETECTED
PATHOLOGY STUDY: NORMAL
PCP UR QL: NEGATIVE
PHENTERMINE UR QL: NOT DETECTED
PREGABALIN UR QL CFM: NOT DETECTED
SERVICE CMNT-IMP: NORMAL
TAPENTADOL UR QL SCN: NOT DETECTED
TAPENTADOL UR QL SCN: NOT DETECTED
TEMAZEPAM UR QL: NOT DETECTED
TRAMADOL UR QL: NEGATIVE
ZOLPIDEM PHENYL-4-CARB UR QL SCN: NOT DETECTED
ZOLPIDEM UR QL: NOT DETECTED

## 2025-04-29 ENCOUNTER — TELEPHONE (OUTPATIENT)
Dept: PAIN MEDICINE | Facility: CLINIC | Age: 60
End: 2025-04-29
Payer: COMMERCIAL

## 2025-04-29 NOTE — TELEPHONE ENCOUNTER
Called pt. to inform her of what Dr. Jimenez said, patient had verbal understanding.  ----- Message from Felecia Jimneez sent at 4/28/2025  4:57 PM CDT -----  Unfortunately, she last picked her medication up on 03/31, we can not fill it any earlier than 4/30.  ----- Message -----  From: Yaritza Francisco MA  Sent: 4/28/2025   2:32 PM CDT  To: Felecia Jimenez, DO Tristan Jimenez, patient called to request a sooner pick-up today for Percocet  from pharmacy? Please assist. Thank you,Yaritza  ----- Message -----  From: Stacey Grande  Sent: 4/28/2025  10:58 AM CDT  To: Darius STEWART Staff    Type:  Needs Medical AdviceWho Called: ptWould the patient rather a call back or a response via [facility]? callBest Call Back Number: [phone] Information: pt wanting to speak regarding her medications

## 2025-04-30 ENCOUNTER — TELEPHONE (OUTPATIENT)
Dept: PAIN MEDICINE | Facility: CLINIC | Age: 60
End: 2025-04-30
Payer: COMMERCIAL

## 2025-04-30 NOTE — TELEPHONE ENCOUNTER
Pt stated she will give me a call back if she's able to schedule due to finding a ride to and from the hospital. Pt voiced understanding.

## 2025-05-08 ENCOUNTER — TELEPHONE (OUTPATIENT)
Dept: PAIN MEDICINE | Facility: CLINIC | Age: 60
End: 2025-05-08
Payer: COMMERCIAL

## 2025-05-08 ENCOUNTER — HOSPITAL ENCOUNTER (OUTPATIENT)
Dept: RADIOLOGY | Facility: HOSPITAL | Age: 60
Discharge: HOME | End: 2025-05-08
Attending: NURSE PRACTITIONER
Payer: COMMERCIAL

## 2025-05-08 ENCOUNTER — OFFICE VISIT (OUTPATIENT)
Dept: PAIN MEDICINE | Facility: CLINIC | Age: 60
End: 2025-05-08
Payer: COMMERCIAL

## 2025-05-08 VITALS
SYSTOLIC BLOOD PRESSURE: 147 MMHG | HEART RATE: 71 BPM | DIASTOLIC BLOOD PRESSURE: 80 MMHG | HEIGHT: 60 IN | BODY MASS INDEX: 27.52 KG/M2 | WEIGHT: 140.19 LBS

## 2025-05-08 DIAGNOSIS — M54.50 ACUTE LOW BACK PAIN WITHOUT SCIATICA, UNSPECIFIED BACK PAIN LATERALITY: ICD-10-CM
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
## 2025-05-08 PROCEDURE — 99999 PR PBB SHADOW E&M-EST. PATIENT-LVL IV: CPT | Mod: PBBFAC,,, | Performed by: NURSE PRACTITIONER

## 2025-05-08 PROCEDURE — 72100 X-RAY EXAM L-S SPINE 2/3 VWS: CPT | Mod: TC,FY

## 2025-05-08 PROCEDURE — 72100 X-RAY EXAM L-S SPINE 2/3 VWS: CPT | Mod: 26,,, | Performed by: RADIOLOGY

## 2025-05-08 RX ORDER — KETOROLAC TROMETHAMINE 30 MG/ML
30 INJECTION, SOLUTION INTRAMUSCULAR; INTRAVENOUS
Status: SHIPPED | OUTPATIENT
Start: 2025-05-08
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
## 2025-05-15 ENCOUNTER — TELEPHONE (OUTPATIENT)
Dept: PAIN MEDICINE | Facility: CLINIC | Age: 60
End: 2025-05-15
Payer: COMMERCIAL

## 2025-05-15 ENCOUNTER — OFFICE VISIT (OUTPATIENT)
Dept: PAIN MEDICINE | Facility: CLINIC | Age: 60
End: 2025-05-15
Payer: COMMERCIAL

## 2025-05-15 DIAGNOSIS — M54.50 CHRONIC BILATERAL LOW BACK PAIN WITHOUT SCIATICA: ICD-10-CM

## 2025-05-15 DIAGNOSIS — G89.29 CHRONIC BILATERAL LOW BACK PAIN WITHOUT SCIATICA: ICD-10-CM

## 2025-05-15 DIAGNOSIS — F11.90 CHRONIC, CONTINUOUS USE OF OPIOIDS: ICD-10-CM

## 2025-05-15 DIAGNOSIS — M96.1 POST LAMINECTOMY SYNDROME: ICD-10-CM

## 2025-05-15 DIAGNOSIS — G89.29 CHRONIC MIDLINE THORACIC BACK PAIN: Primary | ICD-10-CM

## 2025-05-15 DIAGNOSIS — M54.6 CHRONIC MIDLINE THORACIC BACK PAIN: Primary | ICD-10-CM

## 2025-05-15 NOTE — TELEPHONE ENCOUNTER
Patient asked what provider does she need to get disability paper work from? I informed her to get it from the [facility] that deemed her disabled, pt had verbal understanding.    ----- Message from Isabelle sent at 5/15/2025  8:46 AM CDT -----  Type:  Needs Medical AdviceWho Called: pt DAMION ABRAHAM [[id number]]Would the patient rather a call back or a response via [facility]? Call back Best Call Back Number: [phone] Additional Information: pt requesting a call back in regards to paperwork for Dr. Mccoy also has questions  ----- Message -----  From: Isabelle Linn  Sent: 5/15/2025   8:49 AM CDT  To: D[facility] STEWART Staff    Type:  Needs Medical AdviceWho Called: pt DAMION ABRAHAM [[id number]]Would the patient rather a call back or a response via [facility]? Call back Best Call Back Number: [phone] Additional Information: pt requesting a call back in regards to paperwork for Dr. Mccoy also has questions
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
## 2025-05-20 ENCOUNTER — TELEPHONE (OUTPATIENT)
Dept: PAIN MEDICINE | Facility: CLINIC | Age: 60
End: 2025-05-20
Payer: COMMERCIAL

## 2025-05-20 DIAGNOSIS — M54.9 MID BACK PAIN: Primary | ICD-10-CM

## 2025-05-20 NOTE — TELEPHONE ENCOUNTER
Called patient back about having another toradol injection in her upper back. She just had an injection in her lower back. I told her I would find out with Corby and give her a call back to let her know.    ----- Message from Jason sent at 5/20/2025  8:02 AM CDT -----  .Type:  Needs Medical AdviceWho Called: pt Symptoms (please be specific): upper back pain Would the patient rather a call back or a response via [employer]? Call Best Call Back Number:   [phone] Information: requesting to be seen today for an injection

## 2025-05-26 RX ORDER — METHOCARBAMOL 750 MG/1
750 TABLET, FILM COATED ORAL EVERY 8 HOURS
Qty: 90 TABLET | Refills: 2 | Status: SHIPPED | OUTPATIENT
Start: 2025-05-26 | End: 2025-05-26 | Stop reason: SDUPTHER

## 2025-05-26 RX ORDER — METHOCARBAMOL 750 MG/1
750 TABLET, FILM COATED ORAL EVERY 8 HOURS
Qty: 90 TABLET | Refills: 2 | OUTPATIENT
Start: 2025-05-26 | End: 2025-06-25
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
## 2025-07-23 ENCOUNTER — OFFICE VISIT (OUTPATIENT)
Dept: PAIN MEDICINE | Facility: CLINIC | Age: 60
End: 2025-07-23
Payer: COMMERCIAL

## 2025-07-23 VITALS
HEIGHT: 60 IN | DIASTOLIC BLOOD PRESSURE: 81 MMHG | WEIGHT: 133.25 LBS | BODY MASS INDEX: 26.16 KG/M2 | HEART RATE: 71 BPM | SYSTOLIC BLOOD PRESSURE: 123 MMHG

## 2025-07-23 DIAGNOSIS — F11.90 CHRONIC, CONTINUOUS USE OF OPIOIDS: Primary | ICD-10-CM

## 2025-07-23 DIAGNOSIS — Z98.1 STATUS POST CERVICAL SPINAL FUSION: ICD-10-CM

## 2025-07-23 DIAGNOSIS — M47.816 LUMBAR SPONDYLOSIS: ICD-10-CM

## 2025-07-23 DIAGNOSIS — G89.29 CHRONIC BILATERAL LOW BACK PAIN WITHOUT SCIATICA: ICD-10-CM

## 2025-07-23 DIAGNOSIS — M54.50 CHRONIC BILATERAL LOW BACK PAIN WITHOUT SCIATICA: ICD-10-CM

## 2025-07-23 DIAGNOSIS — M96.1 POST LAMINECTOMY SYNDROME: ICD-10-CM

## 2025-07-23 DIAGNOSIS — M54.16 LUMBAR RADICULOPATHY: ICD-10-CM

## 2025-07-23 DIAGNOSIS — M79.18 MYOFASCIAL PAIN SYNDROME OF THORACIC SPINE: ICD-10-CM

## 2025-07-23 DIAGNOSIS — G89.4 CHRONIC PAIN DISORDER: ICD-10-CM

## 2025-07-23 DIAGNOSIS — M54.6 CHRONIC MIDLINE THORACIC BACK PAIN: ICD-10-CM

## 2025-07-23 DIAGNOSIS — G89.29 CHRONIC MIDLINE THORACIC BACK PAIN: ICD-10-CM

## 2025-07-23 DIAGNOSIS — G89.29 CHRONIC RADICULAR PAIN OF LOWER BACK: ICD-10-CM

## 2025-07-23 DIAGNOSIS — M54.16 CHRONIC RADICULAR PAIN OF LOWER BACK: ICD-10-CM

## 2025-07-23 DIAGNOSIS — G89.4 CHRONIC PAIN SYNDROME: ICD-10-CM

## 2025-07-23 DIAGNOSIS — F11.90 CHRONIC, CONTINUOUS USE OF OPIOIDS: ICD-10-CM

## 2025-07-23 DIAGNOSIS — Z98.1 STATUS POST LUMBAR SPINAL FUSION: ICD-10-CM

## 2025-07-23 PROCEDURE — 99214 OFFICE O/P EST MOD 30 MIN: CPT | Mod: S$GLB,,, | Performed by: NURSE PRACTITIONER

## 2025-07-23 PROCEDURE — 1160F RVW MEDS BY RX/DR IN RCRD: CPT | Mod: CPTII,S$GLB,, | Performed by: NURSE PRACTITIONER

## 2025-07-23 PROCEDURE — G2211 COMPLEX E/M VISIT ADD ON: HCPCS | Mod: S$GLB,,, | Performed by: NURSE PRACTITIONER

## 2025-07-23 PROCEDURE — 3044F HG A1C LEVEL LT 7.0%: CPT | Mod: CPTII,S$GLB,, | Performed by: NURSE PRACTITIONER

## 2025-07-23 PROCEDURE — 3079F DIAST BP 80-89 MM HG: CPT | Mod: CPTII,S$GLB,, | Performed by: NURSE PRACTITIONER

## 2025-07-23 PROCEDURE — 3074F SYST BP LT 130 MM HG: CPT | Mod: CPTII,S$GLB,, | Performed by: NURSE PRACTITIONER

## 2025-07-23 PROCEDURE — 3008F BODY MASS INDEX DOCD: CPT | Mod: CPTII,S$GLB,, | Performed by: NURSE PRACTITIONER

## 2025-07-23 PROCEDURE — 1159F MED LIST DOCD IN RCRD: CPT | Mod: CPTII,S$GLB,, | Performed by: NURSE PRACTITIONER

## 2025-07-23 PROCEDURE — 99999 PR PBB SHADOW E&M-EST. PATIENT-LVL III: CPT | Mod: PBBFAC,,, | Performed by: NURSE PRACTITIONER

## 2025-07-23 RX ORDER — OXYCODONE AND ACETAMINOPHEN 10; 325 MG/1; MG/1
1 TABLET ORAL 3 TIMES DAILY PRN
Qty: 90 TABLET | Refills: 0 | Status: SHIPPED | OUTPATIENT
Start: 2025-09-27 | End: 2025-10-27

## 2025-07-23 RX ORDER — OXYCODONE AND ACETAMINOPHEN 10; 325 MG/1; MG/1
1 TABLET ORAL 3 TIMES DAILY PRN
Qty: 90 TABLET | Refills: 0 | Status: SHIPPED | OUTPATIENT
Start: 2025-08-28 | End: 2025-09-27

## 2025-07-23 RX ORDER — OXYCODONE AND ACETAMINOPHEN 10; 325 MG/1; MG/1
1 TABLET ORAL 3 TIMES DAILY PRN
Qty: 90 TABLET | Refills: 0 | Status: SHIPPED | OUTPATIENT
Start: 2025-07-29 | End: 2025-08-28

## 2025-07-29 DIAGNOSIS — M54.6 CHRONIC MIDLINE THORACIC BACK PAIN: ICD-10-CM

## 2025-07-29 DIAGNOSIS — G89.4 CHRONIC PAIN SYNDROME: ICD-10-CM

## 2025-07-29 DIAGNOSIS — F11.90 CHRONIC, CONTINUOUS USE OF OPIOIDS: ICD-10-CM

## 2025-07-29 DIAGNOSIS — G89.4 CHRONIC PAIN DISORDER: ICD-10-CM

## 2025-07-29 DIAGNOSIS — M54.50 CHRONIC BILATERAL LOW BACK PAIN WITHOUT SCIATICA: ICD-10-CM

## 2025-07-29 DIAGNOSIS — M54.16 LUMBAR RADICULOPATHY: ICD-10-CM

## 2025-07-29 DIAGNOSIS — M79.18 MYOFASCIAL PAIN SYNDROME OF THORACIC SPINE: ICD-10-CM

## 2025-07-29 DIAGNOSIS — G89.29 CHRONIC RADICULAR PAIN OF LOWER BACK: ICD-10-CM

## 2025-07-29 DIAGNOSIS — M96.1 POST LAMINECTOMY SYNDROME: ICD-10-CM

## 2025-07-29 DIAGNOSIS — M54.16 CHRONIC RADICULAR PAIN OF LOWER BACK: ICD-10-CM

## 2025-07-29 DIAGNOSIS — M47.816 LUMBAR SPONDYLOSIS: ICD-10-CM

## 2025-07-29 DIAGNOSIS — G89.29 CHRONIC BILATERAL LOW BACK PAIN WITHOUT SCIATICA: ICD-10-CM

## 2025-07-29 DIAGNOSIS — G89.29 CHRONIC MIDLINE THORACIC BACK PAIN: ICD-10-CM

## 2025-07-29 DIAGNOSIS — Z98.1 STATUS POST LUMBAR SPINAL FUSION: ICD-10-CM

## 2025-07-29 DIAGNOSIS — Z98.1 STATUS POST CERVICAL SPINAL FUSION: ICD-10-CM

## 2025-07-29 RX ORDER — OXYCODONE AND ACETAMINOPHEN 10; 325 MG/1; MG/1
1 TABLET ORAL 3 TIMES DAILY PRN
Qty: 90 TABLET | Refills: 0 | Status: SHIPPED | OUTPATIENT
Start: 2025-08-28 | End: 2025-07-30

## 2025-07-29 RX ORDER — OXYCODONE AND ACETAMINOPHEN 10; 325 MG/1; MG/1
1 TABLET ORAL 3 TIMES DAILY PRN
Qty: 90 TABLET | Refills: 0 | Status: SHIPPED | OUTPATIENT
Start: 2025-09-27 | End: 2025-07-30

## 2025-07-29 RX ORDER — OXYCODONE AND ACETAMINOPHEN 10; 325 MG/1; MG/1
1 TABLET ORAL 3 TIMES DAILY PRN
Qty: 90 TABLET | Refills: 0 | Status: SHIPPED | OUTPATIENT
Start: 2025-07-29 | End: 2025-07-30

## 2025-07-30 DIAGNOSIS — G89.29 CHRONIC BILATERAL LOW BACK PAIN WITHOUT SCIATICA: ICD-10-CM

## 2025-07-30 DIAGNOSIS — M96.1 POST LAMINECTOMY SYNDROME: ICD-10-CM

## 2025-07-30 DIAGNOSIS — M54.16 CHRONIC RADICULAR PAIN OF LOWER BACK: ICD-10-CM

## 2025-07-30 DIAGNOSIS — G89.29 CHRONIC RADICULAR PAIN OF LOWER BACK: ICD-10-CM

## 2025-07-30 DIAGNOSIS — F11.90 CHRONIC, CONTINUOUS USE OF OPIOIDS: ICD-10-CM

## 2025-07-30 DIAGNOSIS — M54.16 LUMBAR RADICULOPATHY: ICD-10-CM

## 2025-07-30 DIAGNOSIS — Z98.1 STATUS POST LUMBAR SPINAL FUSION: ICD-10-CM

## 2025-07-30 DIAGNOSIS — M54.6 CHRONIC MIDLINE THORACIC BACK PAIN: ICD-10-CM

## 2025-07-30 DIAGNOSIS — M54.50 CHRONIC BILATERAL LOW BACK PAIN WITHOUT SCIATICA: ICD-10-CM

## 2025-07-30 DIAGNOSIS — M79.18 MYOFASCIAL PAIN SYNDROME OF THORACIC SPINE: ICD-10-CM

## 2025-07-30 DIAGNOSIS — M47.816 LUMBAR SPONDYLOSIS: ICD-10-CM

## 2025-07-30 DIAGNOSIS — G89.4 CHRONIC PAIN DISORDER: ICD-10-CM

## 2025-07-30 DIAGNOSIS — G89.29 CHRONIC MIDLINE THORACIC BACK PAIN: ICD-10-CM

## 2025-07-30 DIAGNOSIS — Z98.1 STATUS POST CERVICAL SPINAL FUSION: ICD-10-CM

## 2025-07-30 DIAGNOSIS — G89.4 CHRONIC PAIN SYNDROME: ICD-10-CM

## 2025-07-30 RX ORDER — OXYCODONE AND ACETAMINOPHEN 10; 325 MG/1; MG/1
1 TABLET ORAL 3 TIMES DAILY PRN
Qty: 90 TABLET | Refills: 0 | Status: SHIPPED | OUTPATIENT
Start: 2025-07-30 | End: 2025-08-29

## 2025-07-30 RX ORDER — OXYCODONE AND ACETAMINOPHEN 10; 325 MG/1; MG/1
1 TABLET ORAL 3 TIMES DAILY PRN
Qty: 90 TABLET | Refills: 0 | Status: SHIPPED | OUTPATIENT
Start: 2025-08-28 | End: 2025-09-27

## 2025-07-30 RX ORDER — OXYCODONE AND ACETAMINOPHEN 10; 325 MG/1; MG/1
1 TABLET ORAL 3 TIMES DAILY PRN
Qty: 90 TABLET | Refills: 0 | Status: SHIPPED | OUTPATIENT
Start: 2025-09-27 | End: 2025-10-27

## 2025-07-31 ENCOUNTER — TELEPHONE (OUTPATIENT)
Dept: PAIN MEDICINE | Facility: CLINIC | Age: 60
End: 2025-07-31
Payer: COMMERCIAL

## 2025-08-27 ENCOUNTER — OFFICE VISIT (OUTPATIENT)
Dept: INTERNAL MEDICINE | Facility: CLINIC | Age: 60
End: 2025-08-27
Payer: COMMERCIAL

## 2025-08-27 VITALS
OXYGEN SATURATION: 98 % | HEIGHT: 60 IN | WEIGHT: 130.75 LBS | BODY MASS INDEX: 25.67 KG/M2 | DIASTOLIC BLOOD PRESSURE: 76 MMHG | SYSTOLIC BLOOD PRESSURE: 114 MMHG | HEART RATE: 75 BPM

## 2025-08-27 DIAGNOSIS — M79.10 MYALGIA: ICD-10-CM

## 2025-08-27 DIAGNOSIS — J31.0 CHRONIC NONALLERGIC RHINITIS: ICD-10-CM

## 2025-08-27 DIAGNOSIS — J06.9 VIRAL URI: Primary | ICD-10-CM

## 2025-08-27 DIAGNOSIS — J45.20 MILD INTERMITTENT CHRONIC ASTHMA WITHOUT COMPLICATION: ICD-10-CM

## 2025-08-27 LAB
CTP QC/QA: YES
CTP QC/QA: YES
POC MOLECULAR INFLUENZA A AGN: NEGATIVE
POC MOLECULAR INFLUENZA B AGN: NEGATIVE
SARS-COV-2 RDRP RESP QL NAA+PROBE: NEGATIVE

## 2025-08-27 PROCEDURE — 99999 PR PBB SHADOW E&M-EST. PATIENT-LVL V: CPT | Mod: PBBFAC,,,

## 2025-08-27 RX ORDER — FLUTICASONE PROPIONATE 50 MCG
2 SPRAY, SUSPENSION (ML) NASAL DAILY
Qty: 16 ML | Refills: 6 | Status: SHIPPED | OUTPATIENT
Start: 2025-08-27

## 2025-08-29 RX ORDER — METHOCARBAMOL 750 MG/1
750 TABLET, FILM COATED ORAL
Qty: 90 TABLET | Refills: 2 | Status: SHIPPED | OUTPATIENT
Start: 2025-08-29